# Patient Record
Sex: FEMALE | Race: WHITE | NOT HISPANIC OR LATINO | Employment: OTHER | URBAN - METROPOLITAN AREA
[De-identification: names, ages, dates, MRNs, and addresses within clinical notes are randomized per-mention and may not be internally consistent; named-entity substitution may affect disease eponyms.]

---

## 2017-04-13 ENCOUNTER — RECORDS - HEALTHEAST (OUTPATIENT)
Dept: LAB | Facility: CLINIC | Age: 62
End: 2017-04-13

## 2017-04-13 LAB
CHOLEST SERPL-MCNC: 140 MG/DL
FASTING STATUS PATIENT QL REPORTED: ABNORMAL
HDLC SERPL-MCNC: 35 MG/DL
LDLC SERPL CALC-MCNC: 79 MG/DL
TRIGL SERPL-MCNC: 131 MG/DL

## 2018-01-29 ENCOUNTER — RECORDS - HEALTHEAST (OUTPATIENT)
Dept: LAB | Facility: CLINIC | Age: 63
End: 2018-01-29

## 2018-01-29 LAB — TSH SERPL DL<=0.005 MIU/L-ACNC: 0.17 UIU/ML (ref 0.3–5)

## 2018-05-24 ENCOUNTER — RECORDS - HEALTHEAST (OUTPATIENT)
Dept: LAB | Facility: CLINIC | Age: 63
End: 2018-05-24

## 2018-05-24 LAB
ALBUMIN SERPL-MCNC: 3.9 G/DL (ref 3.5–5)
ALP SERPL-CCNC: 88 U/L (ref 45–120)
ALT SERPL W P-5'-P-CCNC: 14 U/L (ref 0–45)
ANION GAP SERPL CALCULATED.3IONS-SCNC: 11 MMOL/L (ref 5–18)
AST SERPL W P-5'-P-CCNC: 9 U/L (ref 0–40)
BILIRUB SERPL-MCNC: 0.4 MG/DL (ref 0–1)
BUN SERPL-MCNC: 22 MG/DL (ref 8–22)
CALCIUM SERPL-MCNC: 10 MG/DL (ref 8.5–10.5)
CHLORIDE BLD-SCNC: 102 MMOL/L (ref 98–107)
CHOLEST SERPL-MCNC: 169 MG/DL
CO2 SERPL-SCNC: 27 MMOL/L (ref 22–31)
CREAT SERPL-MCNC: 0.76 MG/DL (ref 0.6–1.1)
FASTING STATUS PATIENT QL REPORTED: ABNORMAL
GFR SERPL CREATININE-BSD FRML MDRD: >60 ML/MIN/1.73M2
GLUCOSE BLD-MCNC: 112 MG/DL (ref 70–125)
HDLC SERPL-MCNC: 45 MG/DL
LDLC SERPL CALC-MCNC: 96 MG/DL
POTASSIUM BLD-SCNC: 3.8 MMOL/L (ref 3.5–5)
PROT SERPL-MCNC: 7.1 G/DL (ref 6–8)
SODIUM SERPL-SCNC: 140 MMOL/L (ref 136–145)
TRIGL SERPL-MCNC: 142 MG/DL
TSH SERPL DL<=0.005 MIU/L-ACNC: 0.37 UIU/ML (ref 0.3–5)

## 2019-05-14 ENCOUNTER — RECORDS - HEALTHEAST (OUTPATIENT)
Dept: LAB | Facility: CLINIC | Age: 64
End: 2019-05-14

## 2019-05-14 LAB
ALBUMIN SERPL-MCNC: 3.8 G/DL (ref 3.5–5)
ALP SERPL-CCNC: 73 U/L (ref 45–120)
ALT SERPL W P-5'-P-CCNC: 19 U/L (ref 0–45)
ANION GAP SERPL CALCULATED.3IONS-SCNC: 9 MMOL/L (ref 5–18)
AST SERPL W P-5'-P-CCNC: 17 U/L (ref 0–40)
BILIRUB SERPL-MCNC: 0.2 MG/DL (ref 0–1)
BUN SERPL-MCNC: 16 MG/DL (ref 8–22)
CALCIUM SERPL-MCNC: 9.7 MG/DL (ref 8.5–10.5)
CHLORIDE BLD-SCNC: 102 MMOL/L (ref 98–107)
CHOLEST SERPL-MCNC: 137 MG/DL
CO2 SERPL-SCNC: 27 MMOL/L (ref 22–31)
CREAT SERPL-MCNC: 0.73 MG/DL (ref 0.6–1.1)
FASTING STATUS PATIENT QL REPORTED: ABNORMAL
GFR SERPL CREATININE-BSD FRML MDRD: >60 ML/MIN/1.73M2
GLUCOSE BLD-MCNC: 71 MG/DL (ref 70–125)
HDLC SERPL-MCNC: 37 MG/DL
LDLC SERPL CALC-MCNC: 71 MG/DL
POTASSIUM BLD-SCNC: 4.2 MMOL/L (ref 3.5–5)
PROT SERPL-MCNC: 6.7 G/DL (ref 6–8)
SODIUM SERPL-SCNC: 138 MMOL/L (ref 136–145)
TRIGL SERPL-MCNC: 147 MG/DL
TSH SERPL DL<=0.005 MIU/L-ACNC: 1.8 UIU/ML (ref 0.3–5)

## 2019-05-15 ENCOUNTER — AMBULATORY - HEALTHEAST (OUTPATIENT)
Dept: SURGERY | Facility: CLINIC | Age: 64
End: 2019-05-15

## 2019-05-15 DIAGNOSIS — E66.01 MORBID OBESITY (H): ICD-10-CM

## 2019-05-17 ENCOUNTER — AMBULATORY - HEALTHEAST (OUTPATIENT)
Dept: SURGERY | Facility: CLINIC | Age: 64
End: 2019-05-17

## 2019-05-17 RX ORDER — ALBUTEROL SULFATE 90 UG/1
2 AEROSOL, METERED RESPIRATORY (INHALATION) EVERY 6 HOURS PRN
Status: SHIPPED | COMMUNITY
Start: 2019-05-17

## 2019-05-17 RX ORDER — DIPHENHYDRAMINE HCL 25 MG
25 CAPSULE ORAL EVERY 6 HOURS PRN
Status: SHIPPED | COMMUNITY
Start: 2019-05-17

## 2019-05-17 RX ORDER — CYCLOBENZAPRINE HCL 10 MG
5-10 TABLET ORAL 3 TIMES DAILY PRN
Status: SHIPPED | COMMUNITY
Start: 2019-05-17 | End: 2022-02-27

## 2019-05-17 RX ORDER — BUDESONIDE AND FORMOTEROL FUMARATE DIHYDRATE 160; 4.5 UG/1; UG/1
2 AEROSOL RESPIRATORY (INHALATION) EVERY MORNING
Status: SHIPPED | COMMUNITY
Start: 2019-05-17

## 2019-12-20 ENCOUNTER — RECORDS - HEALTHEAST (OUTPATIENT)
Dept: ADMINISTRATIVE | Facility: OTHER | Age: 64
End: 2019-12-20

## 2019-12-20 ENCOUNTER — RECORDS - HEALTHEAST (OUTPATIENT)
Dept: LAB | Facility: CLINIC | Age: 64
End: 2019-12-20

## 2019-12-22 LAB — 25(OH)D3 SERPL-MCNC: 13 NG/ML (ref 30–80)

## 2019-12-24 ENCOUNTER — AMBULATORY - HEALTHEAST (OUTPATIENT)
Dept: ADMINISTRATIVE | Facility: CLINIC | Age: 64
End: 2019-12-24

## 2019-12-24 DIAGNOSIS — E66.01 MORBID OBESITY (H): ICD-10-CM

## 2020-01-14 ENCOUNTER — RECORDS - HEALTHEAST (OUTPATIENT)
Dept: ADMINISTRATIVE | Facility: OTHER | Age: 65
End: 2020-01-14

## 2020-07-27 ENCOUNTER — RECORDS - HEALTHEAST (OUTPATIENT)
Dept: LAB | Facility: CLINIC | Age: 65
End: 2020-07-27

## 2020-07-27 LAB
ALBUMIN SERPL-MCNC: 3.7 G/DL (ref 3.5–5)
ALP SERPL-CCNC: 82 U/L (ref 45–120)
ALT SERPL W P-5'-P-CCNC: 14 U/L (ref 0–45)
ANION GAP SERPL CALCULATED.3IONS-SCNC: 12 MMOL/L (ref 5–18)
AST SERPL W P-5'-P-CCNC: 10 U/L (ref 0–40)
BILIRUB SERPL-MCNC: 0.3 MG/DL (ref 0–1)
BUN SERPL-MCNC: 13 MG/DL (ref 8–22)
CALCIUM SERPL-MCNC: 9.4 MG/DL (ref 8.5–10.5)
CHLORIDE BLD-SCNC: 99 MMOL/L (ref 98–107)
CHOLEST SERPL-MCNC: 171 MG/DL
CO2 SERPL-SCNC: 27 MMOL/L (ref 22–31)
CREAT SERPL-MCNC: 0.88 MG/DL (ref 0.6–1.1)
FASTING STATUS PATIENT QL REPORTED: ABNORMAL
GFR SERPL CREATININE-BSD FRML MDRD: >60 ML/MIN/1.73M2
GLUCOSE BLD-MCNC: 153 MG/DL (ref 70–125)
HDLC SERPL-MCNC: 34 MG/DL
LDLC SERPL CALC-MCNC: 94 MG/DL
POTASSIUM BLD-SCNC: 3.9 MMOL/L (ref 3.5–5)
PROT SERPL-MCNC: 7.2 G/DL (ref 6–8)
SODIUM SERPL-SCNC: 138 MMOL/L (ref 136–145)
TRIGL SERPL-MCNC: 217 MG/DL
TSH SERPL DL<=0.005 MIU/L-ACNC: 1.44 UIU/ML (ref 0.3–5)

## 2020-07-28 LAB — 25(OH)D3 SERPL-MCNC: 82.6 NG/ML (ref 30–80)

## 2020-11-18 ENCOUNTER — RECORDS - HEALTHEAST (OUTPATIENT)
Dept: LAB | Facility: CLINIC | Age: 65
End: 2020-11-18

## 2020-11-19 LAB — 25(OH)D3 SERPL-MCNC: 60.9 NG/ML (ref 30–80)

## 2020-11-27 ENCOUNTER — COMMUNICATION - HEALTHEAST (OUTPATIENT)
Dept: SCHEDULING | Facility: CLINIC | Age: 65
End: 2020-11-27

## 2020-12-07 ENCOUNTER — RECORDS - HEALTHEAST (OUTPATIENT)
Dept: ADMINISTRATIVE | Facility: OTHER | Age: 65
End: 2020-12-07

## 2020-12-08 ENCOUNTER — RECORDS - HEALTHEAST (OUTPATIENT)
Dept: ADMINISTRATIVE | Facility: OTHER | Age: 65
End: 2020-12-08

## 2020-12-09 ENCOUNTER — AMBULATORY - HEALTHEAST (OUTPATIENT)
Dept: PULMONOLOGY | Facility: OTHER | Age: 65
End: 2020-12-09

## 2020-12-09 ENCOUNTER — COMMUNICATION - HEALTHEAST (OUTPATIENT)
Dept: PULMONOLOGY | Facility: OTHER | Age: 65
End: 2020-12-09

## 2020-12-09 DIAGNOSIS — J42 CHRONIC BRONCHITIS (H): ICD-10-CM

## 2021-02-17 ENCOUNTER — RECORDS - HEALTHEAST (OUTPATIENT)
Dept: LAB | Facility: CLINIC | Age: 66
End: 2021-02-17

## 2021-02-17 LAB — URATE SERPL-MCNC: 8.5 MG/DL (ref 2–7.5)

## 2021-02-18 LAB
BASOPHILS # BLD AUTO: 0.1 THOU/UL (ref 0–0.2)
BASOPHILS NFR BLD AUTO: 1 % (ref 0–2)
EOSINOPHIL # BLD AUTO: 0.5 THOU/UL (ref 0–0.4)
EOSINOPHIL NFR BLD AUTO: 4 % (ref 0–6)
ERYTHROCYTE [DISTWIDTH] IN BLOOD BY AUTOMATED COUNT: 13.2 % (ref 11–14.5)
HCT VFR BLD AUTO: 42.6 % (ref 35–47)
HGB BLD-MCNC: 13.7 G/DL (ref 12–16)
IMM GRANULOCYTES # BLD: 0.2 THOU/UL
IMM GRANULOCYTES NFR BLD: 1 %
LAB AP CHARGES (HE HISTORICAL CONVERSION): NORMAL
LYMPHOCYTES # BLD AUTO: 2.3 THOU/UL (ref 0.8–4.4)
LYMPHOCYTES NFR BLD AUTO: 15 % (ref 20–40)
MCH RBC QN AUTO: 28.8 PG (ref 27–34)
MCHC RBC AUTO-ENTMCNC: 32.2 G/DL (ref 32–36)
MCV RBC AUTO: 90 FL (ref 80–100)
MONOCYTES # BLD AUTO: 1.1 THOU/UL (ref 0–0.9)
MONOCYTES NFR BLD AUTO: 8 % (ref 2–10)
NEUTROPHILS # BLD AUTO: 10.7 THOU/UL (ref 2–7.7)
NEUTROPHILS NFR BLD AUTO: 72 % (ref 50–70)
OVALOCYTES: ABNORMAL
PATH REPORT.COMMENTS IMP SPEC: NORMAL
PATH REPORT.COMMENTS IMP SPEC: NORMAL
PATH REPORT.FINAL DX SPEC: NORMAL
PATH REPORT.MICROSCOPIC SPEC OTHER STN: ABNORMAL
PATH REPORT.MICROSCOPIC SPEC OTHER STN: NORMAL
PATH REPORT.RELEVANT HX SPEC: NORMAL
PLAT MORPH BLD: NORMAL
PLATELET # BLD AUTO: 439 THOU/UL (ref 140–440)
PMV BLD AUTO: 9 FL (ref 8.5–12.5)
POLYCHROMASIA BLD QL SMEAR: ABNORMAL
RBC # BLD AUTO: 4.75 MILL/UL (ref 3.8–5.4)
REACTIVE LYMPHS: ABNORMAL
WBC: 14.9 THOU/UL (ref 4–11)

## 2021-03-30 ENCOUNTER — RECORDS - HEALTHEAST (OUTPATIENT)
Dept: LAB | Facility: CLINIC | Age: 66
End: 2021-03-30

## 2021-03-31 LAB — URATE SERPL-MCNC: 8.5 MG/DL (ref 2–7.5)

## 2021-04-01 LAB — 25(OH)D3 SERPL-MCNC: 41.9 NG/ML (ref 30–80)

## 2021-05-07 ENCOUNTER — RECORDS - HEALTHEAST (OUTPATIENT)
Dept: LAB | Facility: CLINIC | Age: 66
End: 2021-05-07

## 2021-05-07 LAB — HIV 1+2 AB+HIV1 P24 AG SERPL QL IA: NEGATIVE

## 2021-05-10 LAB
HCV AB SERPL QL IA: NEGATIVE
HEPATITIS B SURFACE ANTIBODY LHE- HISTORICAL: NEGATIVE

## 2021-05-12 ENCOUNTER — AMBULATORY - HEALTHEAST (OUTPATIENT)
Dept: NEUROSURGERY | Facility: CLINIC | Age: 66
End: 2021-05-12

## 2021-05-12 DIAGNOSIS — M54.9 BACK PAIN: ICD-10-CM

## 2021-05-17 ENCOUNTER — RECORDS - HEALTHEAST (OUTPATIENT)
Dept: RADIOLOGY | Facility: CLINIC | Age: 66
End: 2021-05-17

## 2021-05-18 ENCOUNTER — OFFICE VISIT - HEALTHEAST (OUTPATIENT)
Dept: NEUROSURGERY | Facility: CLINIC | Age: 66
End: 2021-05-18

## 2021-05-18 ENCOUNTER — RECORDS - HEALTHEAST (OUTPATIENT)
Dept: GENERAL RADIOLOGY | Facility: CLINIC | Age: 66
End: 2021-05-18

## 2021-05-18 DIAGNOSIS — M54.16 LUMBAR RADICULOPATHY: ICD-10-CM

## 2021-05-18 DIAGNOSIS — M54.9 DORSALGIA, UNSPECIFIED: ICD-10-CM

## 2021-05-19 ENCOUNTER — RECORDS - HEALTHEAST (OUTPATIENT)
Dept: ADMINISTRATIVE | Facility: OTHER | Age: 66
End: 2021-05-19

## 2021-05-19 ASSESSMENT — MIFFLIN-ST. JEOR: SCORE: 1988.78

## 2021-05-21 ENCOUNTER — SURGERY - HEALTHEAST (OUTPATIENT)
Dept: NEUROSURGERY | Facility: CLINIC | Age: 66
End: 2021-05-21

## 2021-05-21 DIAGNOSIS — M54.16 LUMBAR RADICULOPATHY: ICD-10-CM

## 2021-05-24 ENCOUNTER — RECORDS - HEALTHEAST (OUTPATIENT)
Dept: ADMINISTRATIVE | Facility: CLINIC | Age: 66
End: 2021-05-24

## 2021-05-25 ENCOUNTER — RECORDS - HEALTHEAST (OUTPATIENT)
Dept: ADMINISTRATIVE | Facility: CLINIC | Age: 66
End: 2021-05-25

## 2021-05-26 ENCOUNTER — RECORDS - HEALTHEAST (OUTPATIENT)
Dept: ADMINISTRATIVE | Facility: CLINIC | Age: 66
End: 2021-05-26

## 2021-05-27 ENCOUNTER — RECORDS - HEALTHEAST (OUTPATIENT)
Dept: ADMINISTRATIVE | Facility: CLINIC | Age: 66
End: 2021-05-27

## 2021-05-27 ENCOUNTER — COMMUNICATION - HEALTHEAST (OUTPATIENT)
Dept: NEUROSURGERY | Facility: CLINIC | Age: 66
End: 2021-05-27

## 2021-05-27 VITALS — WEIGHT: 293 LBS | BODY MASS INDEX: 48.55 KG/M2 | HEIGHT: 67 IN

## 2021-05-27 VITALS
HEART RATE: 80 BPM | DIASTOLIC BLOOD PRESSURE: 76 MMHG | SYSTOLIC BLOOD PRESSURE: 138 MMHG | OXYGEN SATURATION: 94 % | RESPIRATION RATE: 16 BRPM

## 2021-05-28 ENCOUNTER — RECORDS - HEALTHEAST (OUTPATIENT)
Dept: ADMINISTRATIVE | Facility: CLINIC | Age: 66
End: 2021-05-28

## 2021-05-29 ENCOUNTER — AMBULATORY - HEALTHEAST (OUTPATIENT)
Dept: SURGERY | Facility: HOSPITAL | Age: 66
End: 2021-05-29

## 2021-05-29 DIAGNOSIS — Z11.59 ENCOUNTER FOR SCREENING FOR OTHER VIRAL DISEASES: ICD-10-CM

## 2021-05-30 ENCOUNTER — RECORDS - HEALTHEAST (OUTPATIENT)
Dept: ADMINISTRATIVE | Facility: CLINIC | Age: 66
End: 2021-05-30

## 2021-05-31 ENCOUNTER — RECORDS - HEALTHEAST (OUTPATIENT)
Dept: ADMINISTRATIVE | Facility: CLINIC | Age: 66
End: 2021-05-31

## 2021-06-01 ENCOUNTER — RECORDS - HEALTHEAST (OUTPATIENT)
Dept: ADMINISTRATIVE | Facility: CLINIC | Age: 66
End: 2021-06-01

## 2021-06-02 ENCOUNTER — RECORDS - HEALTHEAST (OUTPATIENT)
Dept: ADMINISTRATIVE | Facility: CLINIC | Age: 66
End: 2021-06-02

## 2021-06-02 ENCOUNTER — COMMUNICATION - HEALTHEAST (OUTPATIENT)
Dept: NEUROSURGERY | Facility: CLINIC | Age: 66
End: 2021-06-02

## 2021-06-02 ENCOUNTER — AMBULATORY - HEALTHEAST (OUTPATIENT)
Dept: NEUROSURGERY | Facility: CLINIC | Age: 66
End: 2021-06-02

## 2021-06-02 DIAGNOSIS — Z01.818 PRE-OP EXAM: ICD-10-CM

## 2021-06-14 ENCOUNTER — RECORDS - HEALTHEAST (OUTPATIENT)
Dept: ADMINISTRATIVE | Facility: OTHER | Age: 66
End: 2021-06-14

## 2021-06-16 PROBLEM — M1A.00X0 CHRONIC GOUTY ARTHRITIS: Status: ACTIVE | Noted: 2021-05-18

## 2021-06-16 PROBLEM — M1A.9XX0 CHRONIC GOUTY ARTHRITIS: Status: ACTIVE | Noted: 2021-05-18

## 2021-06-16 PROBLEM — M54.16 LUMBAR RADICULOPATHY: Status: ACTIVE | Noted: 2021-05-26

## 2021-06-16 PROBLEM — J45.990 EXERCISE-INDUCED ASTHMA: Status: ACTIVE | Noted: 2021-05-11

## 2021-06-16 PROBLEM — F41.9 ANXIETY: Status: ACTIVE | Noted: 2021-05-11

## 2021-06-16 PROBLEM — E55.9 VITAMIN D DEFICIENCY: Status: ACTIVE | Noted: 2021-05-11

## 2021-06-16 PROBLEM — K21.9 GASTRO-ESOPHAGEAL REFLUX DISEASE WITHOUT ESOPHAGITIS: Status: ACTIVE | Noted: 2021-05-11

## 2021-06-16 PROBLEM — F33.1 MODERATE RECURRENT MAJOR DEPRESSION (H): Status: ACTIVE | Noted: 2021-05-11

## 2021-06-16 PROBLEM — K56.609 SBO (SMALL BOWEL OBSTRUCTION) (H): Status: ACTIVE | Noted: 2020-11-25

## 2021-06-16 PROBLEM — M54.30 SCIATICA: Status: ACTIVE | Noted: 2021-05-18

## 2021-06-16 PROBLEM — R07.9 CHEST PAIN OF UNCERTAIN ETIOLOGY: Status: ACTIVE | Noted: 2017-07-09

## 2021-06-16 PROBLEM — F17.200 CURRENT SMOKER: Status: ACTIVE | Noted: 2021-05-11

## 2021-06-16 PROBLEM — M10.00 IDIOPATHIC GOUT: Status: ACTIVE | Noted: 2021-05-11

## 2021-06-17 ENCOUNTER — RECORDS - HEALTHEAST (OUTPATIENT)
Dept: LAB | Facility: CLINIC | Age: 66
End: 2021-06-17

## 2021-06-17 LAB
ANION GAP SERPL CALCULATED.3IONS-SCNC: 15 MMOL/L (ref 5–18)
APTT PPP: 29 SECONDS (ref 24–37)
BUN SERPL-MCNC: 12 MG/DL (ref 8–22)
CALCIUM SERPL-MCNC: 9 MG/DL (ref 8.5–10.5)
CHLORIDE BLD-SCNC: 101 MMOL/L (ref 98–107)
CLOSURE TME COLL+EPINEP BLD: 79 SEC (ref 1–180)
CO2 SERPL-SCNC: 23 MMOL/L (ref 22–31)
CREAT SERPL-MCNC: 0.76 MG/DL (ref 0.6–1.1)
GFR SERPL CREATININE-BSD FRML MDRD: >60 ML/MIN/1.73M2
GLUCOSE BLD-MCNC: 150 MG/DL (ref 70–125)
INR PPP: 0.98 (ref 0.9–1.1)
POTASSIUM BLD-SCNC: 4.1 MMOL/L (ref 3.5–5)
SODIUM SERPL-SCNC: 139 MMOL/L (ref 136–145)
TSH SERPL DL<=0.005 MIU/L-ACNC: 7.86 UIU/ML (ref 0.3–5)

## 2021-06-17 NOTE — PROGRESS NOTES
Neurosurgery consultation was requested by: Disha   Pain: Low back   Radicular Pain is present: Right leg   Lhermitte sign: No  Motor complaints: Weakness right leg   Sensory complaints: Numbness tingling right foot   Gait and balance issues: Trouble walking due to pain - uses walker   Bowel or bladder issues: No  Injury: No  Severity is: Chronic   Patient has tried the following conservative measures: Injections, Water/Pool     Leigha Bond CMA,1:56 PM

## 2021-06-17 NOTE — PROGRESS NOTES
NEUROSURGERY CONSULTATION NOTE    5/18/2021     Sharmila Shore is a 65 y.o. female who is sent to us in consultation by Lex Gauthier for evaluation of lumbar radiculopathy.         CONSULTATION ASSESSMENT AND PLAN:    64 yo female who presents with 6 months of low back and right leg pain.  Right L5 radiculopathy with weakness.  Lumbar x-rays did not show any significant dynamic instability.  MRI lumbar spine shows a right posterior lateral disc herniation at lumbar 4-5 in combination with facet arthropathy impinge the right L5 nerve root.  Patient has not had significant improvement with conservative management.  We discussed that patient's morbid obesity would increase risk of complication with surgery including but not limited to wound infection,  post op hematoma, recurrent disc herniation. Discussed risks and benefits of right L4-5 microdiscectomy in detail. She agreed to proceed.     I spent more than 45 minutes in this apt, examining the pt, reviewing the scans, reviewing notes from chart, discussing treatment options with risks and benefits and coordinating care.     Celina Hyde     HPI:  64 yo female who presents with 6 months of low back and right leg pain. Right leg pain and numbness and tingling in right L5 distribution. Right leg feels weak. Laying on the side will improve her symptoms. Sitting, standing and walking worsen her pain.  Has gained weight due to inability to ambulate. Urinary urgency for last year. Wearing pads. No bowel dysfunction.     Medrol dose pack did not cause significant relief.  TFESI 1 month of relief. 2nd no relief. Norco and flexeril without significant relief.     Prior Spine Surgery:no     Past Medical History:   Diagnosis Date     CAD (coronary artery disease)      Chronic pain syndrome      COPD (chronic obstructive pulmonary disease) (H)      COPD (chronic obstructive pulmonary disease) (H)      Disease of thyroid gland      Hyperlipidemia      Hypertension       Hypothyroidism      OAB (overactive bladder)      Obesity        Past Surgical History:   Procedure Laterality Date     APPENDECTOMY       CARDIAC CATHETERIZATION       COLON SURGERY       HERNIA REPAIR       HYSTERECTOMY       TUBAL LIGATION         REVIEW OF SYSTEMS:  ROS reviewed with pt as documented on pt health form of 5/18/2021.     No family hx of anesthetic reactions. Mother and grandmother have a h/o of blood clots.    MEDICATIONS:  Current Outpatient Medications   Medication Sig Dispense Refill     albuterol (PROAIR HFA;PROVENTIL HFA;VENTOLIN HFA) 90 mcg/actuation inhaler Inhale 2 puffs every 6 (six) hours as needed for wheezing.        budesonide-formoterol (SYMBICORT) 160-4.5 mcg/actuation inhaler Inhale 2 puffs every morning.        cyclobenzaprine (FLEXERIL) 10 MG tablet Take 5-10 mg by mouth 3 (three) times a day as needed for muscle spasms.        diphenhydrAMINE (BENADRYL) 25 mg capsule Take 25 mg by mouth at bedtime as needed for itching.       HYDROcodone-acetaminophen (NORCO) 7.5-325 mg per tablet Take 1 tablet by mouth 3 (three) times a day.        levothyroxine (SYNTHROID, LEVOTHROID) 112 MCG tablet Take 112 mcg by mouth Daily at 6:00 am.       losartan-hydrochlorothiazide (HYZAAR) 50-12.5 mg per tablet Take 1 tablet by mouth Daily after lunch.       nitroglycerin (NITROSTAT) 0.4 MG SL tablet Place 1 tablet (0.4 mg total) under the tongue daily as needed for chest pain. 30 tablet 0     ondansetron (ZOFRAN) 4 MG tablet Take 1 tablet (4 mg total) by mouth every 8 (eight) hours as needed. 15 tablet 0     prochlorperazine (COMPAZINE) 5 MG tablet Take 1 tablet (5 mg total) by mouth every 6 (six) hours as needed. 15 tablet 0     simvastatin (ZOCOR) 20 MG tablet Take 20 mg by mouth at bedtime.       No current facility-administered medications for this visit.          ALLERGIES/SENSITIVITIES:     Allergies   Allergen Reactions     Amoxicillin-Pot Clavulanate Other (See Comments)     Stomach upset.  "    Azithromycin Hives     Macrobid [Nitrofurantoin Monohyd/M-Cryst] Hives     Morphine Headache     Oxycodone Itching     In Percocet. Okay with acetaminophen.      Sulfa (Sulfonamide Antibiotics) Hives       PERTINENT SOCIAL HISTORY:   Social History     Socioeconomic History     Marital status: Single     Spouse name: Not on file     Number of children: Not on file     Years of education: Not on file     Highest education level: Not on file   Occupational History     Not on file   Social Needs     Financial resource strain: Not on file     Food insecurity     Worry: Not on file     Inability: Not on file     Transportation needs     Medical: Not on file     Non-medical: Not on file   Tobacco Use     Smoking status: Former Smoker     Quit date: 2018     Years since quittin.4   Substance and Sexual Activity     Alcohol use: No     Drug use: No     Sexual activity: Not on file   Lifestyle     Physical activity     Days per week: Not on file     Minutes per session: Not on file     Stress: Not on file   Relationships     Social connections     Talks on phone: Not on file     Gets together: Not on file     Attends Tenriism service: Not on file     Active member of club or organization: Not on file     Attends meetings of clubs or organizations: Not on file     Relationship status: Not on file     Intimate partner violence     Fear of current or ex partner: Not on file     Emotionally abused: Not on file     Physically abused: Not on file     Forced sexual activity: Not on file   Other Topics Concern     Not on file   Social History Narrative     Not on file         FAMILY HISTORY:  Family History   Problem Relation Age of Onset     Heart attack Father         PHYSICAL EXAM:   Constitutional: /76   Pulse 80   Resp 16   Ht 5' 7\" (1.702 m)   Wt (!) 310 lb (140.6 kg)   SpO2 94%   BMI 48.55 kg/m       Mental Status: A & O in no acute distress.  Affect is appropriate.  Speech is fluent.  Recent and " remote memory are intact.  Attention span and concentration are normal.     Motor:  Normal bulk and tone all muscle groups of upper and lower extremities. R DF and EHL 4/5     Sensory: Sensation intact bilaterally to light touch diminished in right L5     Coordination:  Very antalgic gait      Reflexes; supinator, biceps, triceps, knee/ ankle jerk intact. no hoffmans/ no    babinski/ clonus.    IMAGING: I personally reviewed all radiographic images      Cc:   Lex Gauthier MD  [address] [facility] [address]

## 2021-06-17 NOTE — PATIENT INSTRUCTIONS - HE
Right L4-5 Microdiscectomy.     Provided complete information about approaching surgery.  Discussed discharge planning and expected outcomes after surgery as well as follow-ups and restrictions.  Emphasized on stop taking ASA, NSAID's, vitamins and /or OTC herbal supplements within 10 days and any anticoagulant meds within 7 days prior to surgery.  Reminded patient to bring all pertinent films to hospital the day of surgery.    NPO after midnight, Please arrive 2.5 hours prior to scheduled surgery.    Using a washcloth and a bottle of provided Hibiclens, wash your body, avoiding your face and genitals. Preferably, shower the night before surgery and the morning of surgery using a half a bottle each time for your whole body shower. If you are unable to take a shower in the morning of surgery, please discuss your options with the nurse at your readiness visit.     Provided written pamphlets about surgery and Hibiclens bottle.  Answered all questions.  The  will call patient with all pre-op orders and instructions.  Patient to complete all required diagnostic tests prior to surgery.  If test are not completed this will cancel the surgery; contact the clinic nurses at [phone] if unable to complete test.

## 2021-06-18 ENCOUNTER — COMMUNICATION - HEALTHEAST (OUTPATIENT)
Dept: NEUROSURGERY | Facility: CLINIC | Age: 66
End: 2021-06-18

## 2021-06-18 ENCOUNTER — RECORDS - HEALTHEAST (OUTPATIENT)
Dept: ADMINISTRATIVE | Facility: OTHER | Age: 66
End: 2021-06-18

## 2021-06-18 LAB
SARS-COV-2 PCR COMMENT: NORMAL
SARS-COV-2 RNA SPEC QL NAA+PROBE: NEGATIVE
SARS-COV-2 VIRUS SPECIMEN SOURCE: NORMAL

## 2021-06-18 ASSESSMENT — MIFFLIN-ST. JEOR: SCORE: 1988.78

## 2021-06-21 ENCOUNTER — RECORDS - HEALTHEAST (OUTPATIENT)
Dept: ADMINISTRATIVE | Facility: OTHER | Age: 66
End: 2021-06-21

## 2021-06-21 ENCOUNTER — SURGERY - HEALTHEAST (OUTPATIENT)
Dept: SURGERY | Facility: HOSPITAL | Age: 66
End: 2021-06-21
Payer: COMMERCIAL

## 2021-06-21 ASSESSMENT — MIFFLIN-ST. JEOR: SCORE: 1949.32

## 2021-06-21 NOTE — LETTER
Letter by Amari Estrada MD at      Author: Amari Estrada MD Service: -- Author Type: --    Filed:  Encounter Date: 12/9/2020 Status: (Other)         Sharmila Shore  [address] [place] [address]    December 9, 2020    Dear Ms. Shore,    Welcome to [facility]! Your appointment information is below.   Please bring the following to your appointment:    Insurance Card, so we may scan it for our records    Drivers license or valid ID, so we may scan it for our records    Co-pay (as applicable per your insurance plan)    A current list of your medications including over the counter products such as vitamins and supplements    Your medical records including copies of X-Ray films if you are transferring your care from another clinic.  If you do not have your records, please fill out the release of information form and we will request those records.     Provider: Amari Estrada MD  Appointment Date:   Wednesday, February 3, 2021  Arrival Time:  10:00 PFT,  11:00 dr appt.    Location: [address]        [facility], [address]    **Please allow adequate time for your commute and parking. If you are more than 10 minutes late, you may be asked to reschedule.     If you need to cancel or reschedule your appointment, please notify us at least 24 hours prior to your appointment time so we are able to make this time available for another patient.    Thank you for choosing the [facility] for your health care needs. If you have any questions, please do not hesitate to contact us at any time at   [phone]. We look forward to caring for you.     Sincerely,     [facility] [place] staff

## 2021-06-22 ENCOUNTER — RECORDS - HEALTHEAST (OUTPATIENT)
Dept: ADMINISTRATIVE | Facility: OTHER | Age: 66
End: 2021-06-22

## 2021-06-22 ASSESSMENT — MIFFLIN-ST. JEOR: SCORE: 1988.78

## 2021-06-25 NOTE — TELEPHONE ENCOUNTER
ORDER FROM: Dr. Hyde    PRE AUTHORIZATION: PA pending. Ok to schedule    METHOD OF PATIENT CONTACT: Spoke with Sharmila on the phone. Best number to reach: [phone]    PROCEDURE: Right lumbar 4-lumbar 5 hemilaminectomy, medial facetectomy, foraminotomy, microdiscectomy    SURGICAL DATE: 21 @ 7:15 AM - [facility]    READINESS VISIT: PLEASE CALL    PCP, CLINIC, PHONE #: Dr. Lex Gauthier, [facility], [phone]    PRE-OP PHYSICAL: 6/15/21 @ 1:15 PM with Dr. Gauthier    COVID-19 TESTIN21 @ [facility]    FILM INFO: XRAY LUMBAR: 2021 @ [facility]          MRI LUMBAR: 3/30/21 @ [facility] (loaded to [facility])    SURGERY CONFIRMATION LETTER: E-mailed to patient on 21

## 2021-06-26 NOTE — TELEPHONE ENCOUNTER
Called patient, discussed surgery, post-op course, expectations, follow up plan.    Reviewed H&P from 06/15/2021 - cleared for surgery  Labs, EKG - WNL     MRI done on 03/30/2021 - in [facility]    To OR as planned.     Check in - 0500    Nothing to eat or drink after midnight the night before surgery.     Reviewed with patient: Arrive 2.5 -3 hours prior to scheduled surgery.    Bring all pertinent films to hospital the day of surgery.     Continue to refrain from NSAIDS (Ibuprofen, Aleve, Naprosyn), ASA, Over the counter herbal medications or supplements, anti-coagulants and blood thinners.     Patient confirmed they have help/assistance in place at home upon discharge    Instructions: using a washcloth and a bottle of provided Hibiclens, wash your body, avoiding your face and genitals. Preferably, shower the night before surgery and the morning of surgery using a half a bottle each time for your whole body shower.        Patient was informed that we will provide up to 1 week prescription of pain medication for post-operative pain.      Instructed patient about the following: After your surgery, if you will be staying in-patient, a nursing provider team will be monitoring you closely throughout your stay and communicate your health status to your surgeon and other providers.  You will be seen by Advanced Practice Providers (e.g., nurse practitioners, clinic nurse specialist, and physician assistants) who will check on you regularly to assess the status of your surgery.     Gabby Walters RN, CNRN

## 2021-06-29 ENCOUNTER — COMMUNICATION - HEALTHEAST (OUTPATIENT)
Dept: SCHEDULING | Facility: CLINIC | Age: 66
End: 2021-06-29

## 2021-06-30 ENCOUNTER — RECORDS - HEALTHEAST (OUTPATIENT)
Dept: LAB | Facility: CLINIC | Age: 66
End: 2021-06-30

## 2021-06-30 ENCOUNTER — COMMUNICATION - HEALTHEAST (OUTPATIENT)
Dept: NEUROSURGERY | Facility: CLINIC | Age: 66
End: 2021-06-30

## 2021-07-01 ENCOUNTER — COMMUNICATION - HEALTHEAST (OUTPATIENT)
Dept: GERIATRICS | Facility: CLINIC | Age: 66
End: 2021-07-01

## 2021-07-01 LAB
ANION GAP SERPL CALCULATED.3IONS-SCNC: 11 MMOL/L (ref 5–18)
BUN SERPL-MCNC: 10 MG/DL (ref 8–22)
CALCIUM SERPL-MCNC: 8.9 MG/DL (ref 8.5–10.5)
CHLORIDE BLD-SCNC: 98 MMOL/L (ref 98–107)
CO2 SERPL-SCNC: 30 MMOL/L (ref 22–31)
CREAT SERPL-MCNC: 0.75 MG/DL (ref 0.6–1.1)
ERYTHROCYTE [DISTWIDTH] IN BLOOD BY AUTOMATED COUNT: 13.9 % (ref 11–14.5)
GFR SERPL CREATININE-BSD FRML MDRD: >60 ML/MIN/1.73M2
GLUCOSE BLD-MCNC: 105 MG/DL (ref 70–125)
HCT VFR BLD AUTO: 40.2 % (ref 35–47)
HGB BLD-MCNC: 12.5 G/DL (ref 12–16)
MCH RBC QN AUTO: 28.9 PG (ref 27–34)
MCHC RBC AUTO-ENTMCNC: 31.1 G/DL (ref 32–36)
MCV RBC AUTO: 93 FL (ref 80–100)
PLATELET # BLD AUTO: 399 THOU/UL (ref 140–440)
PMV BLD AUTO: 9.5 FL (ref 8.5–12.5)
POTASSIUM BLD-SCNC: 4.2 MMOL/L (ref 3.5–5)
RBC # BLD AUTO: 4.33 MILL/UL (ref 3.8–5.4)
SODIUM SERPL-SCNC: 139 MMOL/L (ref 136–145)
WBC: 11 THOU/UL (ref 4–11)

## 2021-07-04 NOTE — TELEPHONE ENCOUNTER
Telephone Encounter by Alfredo Osullivan RN at 7/1/2021  4:08 PM     Author: Alfredo Osullivan RN Service: -- Author Type: Registered Nurse    Filed: 7/1/2021  4:10 PM Encounter Date: 7/1/2021 Status: Signed    : Alfredo Osullivan RN (Registered Nurse)       [facility] Nurse Triage Telephone Note      Provider: Jozef Quispe DO  Facility: [facility]    Facility Type: TCU    Caller: Rocío  Call Back Number:  [phone]    Allergies: Amoxicillin-pot clavulanate, Azithromycin, Macrobid [nitrofurantoin monohyd/m-cryst], Morphine, Oxycodone, Sulfa (sulfonamide antibiotics), and Gabapentin    Reason for call: Nurse calling to report Heme 2 and BMP results.  Notable meds:  Allopurinol 300mg daily, Hyzaar 50-12.5mg daily, Metformin 1000mg two times a day.       Verbal Order/Direction given by Provider: No new orders.      Provider giving order: Jozef Quispe DO    Verbal order given to: Rocío Osullivan RN

## 2021-07-04 NOTE — TELEPHONE ENCOUNTER
Telephone Encounter by Dahlia Abbasi at 6/30/2021  3:39 PM     Author: Dahlia Abbasi Service: -- Author Type: Patient Access    Filed: 6/30/2021  3:40 PM Encounter Date: 6/29/2021 Status: Signed    : Dahlia Abbasi (Patient Access)       6/30/21 Spoke alexandro Morocho at [facility]. She will speak to patient and see if patient prefers staple removal at TCU or if she'd rather come to clinic. Bailee to cb tomorrow to schedule post op appts for patient.

## 2021-07-04 NOTE — LETTER
Letter by Celina Hyde MD at      Author: Celina Hyde MD Service: -- Author Type: --    Filed:  Encounter Date: 5/27/2021 Status: (Other)       Dear Ms. Shore,    This letter will help in preparation for your upcoming surgery. Please contact us with any additional questions you may have regarding your surgery. Contact information for your surgery scheduler:   Chilo Hyde and Ada: [phone] ~ Rosalina Lao and Lexis:  [phone] ~ Brandan    You are scheduled for: Right Lumbar Hemilaminectomy, Medial Facetectomy, Foraminotomy and Microdiscetomy  With: Dr. Celina Hyde  Date/Time: Monday, June 21, 2021 at 7:15 am  (time subject to change)  Location: [place] [address]    Check in at the Welcome Desk inside the main doors of the hospital. An escort will take you to the surgery waiting area. A nurse from [facility] (surgery admit unit) at the hospital will call you with your exact arrival time to the hospital - typically one-and-a-half to two-and-a-half hours prior to your scheduled surgery time.     In the event of an emergency surgery case, there may be an adjustment to your start time for surgery.     PREPARING FOR YOUR SURGERY    *Pre-op Physical: Tuesday, Viviana 15, 2021 at 1:15 pm with Dr. Lex Gauthier at the [facility]. Please have your LABS completed at this appointment as well. Orders have been placed with your primary care provider.     *Please discuss the necessity of receiving a pneumococcal vaccine prior to surgery at your pre-op physical. Recommended for all patients over the age of 65, or based on certain medical conditions.     *After the pre-op physical is complete, please have your clinic fax the visit note to your surgery scheduler at [phone].    *Pre-op Lab Work: Tuesday, Viviana 15, 2021 at your Pre-op Physical appointment.     *COVID-19 Testing: Thursday, June 17th at the [facility], the staff will schedule the time for the test when you are there for your Pre-op physical. The testing needs to done 4 days before surgery.     *Readiness Visit: Dr. Hyde's nurse will call you prior to your procedure to go over your Pre-op physical and lab results, give Pre-surgery instructions and also answer any additional questions you may have about your upcoming surgery.     *Ensure that you have completed your pre-op physical, along with any other necessary tests/appointments (listed above), prior to your Readiness Visit.           ADDITIONAL INFORMATION REGARDING YOUR SURGERY    Medications    Bring a list ALL of your medications, including any over-the-counter vitamins and herbal supplements to your Readiness Visit, and on the day of surgery.    DO NOT bring your medications with you the day of surgery.    Please see attached third sheet for more details on medications/vitamins/herbal supplements that should be discontinued prior to your surgery date.     If you are unsure if you should discontinue a medication/ vitamin/herbal supplement, please call our office and discuss with a nurse.    Continue taking your medications/vitamins/herbal supplements unless they are on the attached list.     Failure to follow the instructions regarding medications/vitamins/herbal supplements will result in cancellation of your surgery.    Day BEFORE Surgery    DO NOT shave near your surgical site. This can cause irritation of the skin    Using a washcloth and provided bottle of Hibiclens, shower the night BEFORE surgery, using a half bottle of Hibiclens to wash your body, avoiding face and genitals. The morning OF surgery, shower and use the second half of the bottle to wash your body, avoiding face and genitals. If you are unable to take a shower the morning of surgery, please discuss your options with the nurse at your readiness visit.     NOTHING  to eat after 11:00 p.m. the night prior to your  procedure    CLEAR LIQUIDS: May have the following liquids up to two (2) hours before your arrival time at the hospital: water, plain black coffee (no cream or milk), plain black tea or plain green tea (no cream or milk), Gatorade or Propel Water.    SMOKING: Stop smoking as far before surgery as possible, or as directed by your surgeon. NO tobacco products of any kind (cigarettes, e-cigarettes, chewing tobacco) beginning at 11:00 p.m. the night prior to your procedure.     ALCOHOL: You should stop drinking alcohol beginning at 11:00 p.m. the night prior to your procedure    Contact our office if you have symptoms of illness such as a fever of 101 or greater, chills, cough, sore throat, or if you develop a rash or any open sore    Day OF Surgery    If youve been instructed to take a medication(s) on the morning of surgery, please take with a very small sip of water.    Wear loose & comfortable clothing and flat shoes, Leave jewelry/valuables at home. If you wear contact lenses, remove them at home and wear glasses. Remove any body piercings. Remove nail polish.     Planning for Discharge    Start planning for your care after discharge as soon as you receive this letter.    If you have not made arrangements to have someone take you home and stay with you for the first 48 hours after discharge, your surgery will be cancelled.        PRE-OPERATIVE MEDICATION INSTRUCTIONS  Review this information with your primary care physician prior to discontinuing any of the medications listed below.  Notify your primary care physician that you have been instructed to discontinue these medications.    TEN (10) Days Prior to Surgery, STOP the Following Medications   Patricia-Maurice  Anacin  Aspirin  Excedrin  Pepto Bismol    **Before taking ANY over-the-counter medications, check the label for Aspirin   Non-steroidal   Anti-inflammatory Medications (NSAIDS)    Celebrex  Diclofenac (Cataflam)  Etodolac (Iodine)  Fenoprofen  (Nalfon)  Ibuprofen (Advil, Motrin, Nuprin)  Indomethacin (Indocin)  Ketoprofen  Ketorolac (Toradol)  Melaxicam (Mobic)  Naproxen (AnaProx, Aleve, Naprosyn)  Relafen (Nabumetone)   Herbal Supplements (this is a partial list of herbals to be discontinued)    Gurpreet Toth Qucorine  Ephedra  Feverfew  Fish Oil  Flaxseed Oil  Garlic  Antonina  Gingko  Ginseng  Goldenseal  Imitrex (Sumatriptan)  Kava  Krill Oil  Licorice  Multi Vitamins  [facility]  Valerian  Vitamin E  Yohimbe   CHECK WITH YOUR PRESCRIBING DOCTOR BEFORE STOPPING ANY BLOOD THINNERS (approximately 7 days prior to surgery)  (Coumadin, Plavix, Platel, Aggrenox, Effient (Prasugrel), Ticlid), Xarelto, and Pradaxa      ALWAYS CHECK WITH YOUR PRESCRIBING DOCTOR REGARDING THE MEDICATIONS LISTED BELOW; RECOMMENDED STOP TIME IS ALSO LISTED      If you are taking Lovenox, discontinue 24 HOURS prior to surgery    If you are taking weight loss medication, discontinue 7 days prior to surgery    If you are taking Metformin or Simvastatin, check with your primary care physician (or whoever has prescribed you this medication) regarding when to discontinue prior to surgery

## 2021-07-05 ENCOUNTER — RECORDS - HEALTHEAST (OUTPATIENT)
Dept: LAB | Facility: CLINIC | Age: 66
End: 2021-07-05

## 2021-07-05 PROBLEM — G93.41 METABOLIC ENCEPHALOPATHY: Status: ACTIVE | Noted: 2021-06-27

## 2021-07-05 PROBLEM — J45.909 ASTHMA: Status: ACTIVE | Noted: 2021-05-11

## 2021-07-05 PROBLEM — N39.0 URINARY TRACT INFECTION: Status: ACTIVE | Noted: 2021-06-27

## 2021-07-05 PROBLEM — E11.9 TYPE 2 DIABETES MELLITUS WITHOUT COMPLICATION, WITHOUT LONG-TERM CURRENT USE OF INSULIN (H): Status: ACTIVE | Noted: 2021-06-25

## 2021-07-05 PROBLEM — M51.16 LUMBAR DISC HERNIATION WITH RADICULOPATHY: Status: ACTIVE | Noted: 2021-06-21

## 2021-07-05 PROBLEM — R73.9 HYPERGLYCEMIA: Status: ACTIVE | Noted: 2021-06-22

## 2021-07-05 PROBLEM — J96.01 ACUTE RESPIRATORY FAILURE WITH HYPOXIA (H): Status: ACTIVE | Noted: 2021-06-27

## 2021-07-05 LAB
ALBUMIN UR-MCNC: NEGATIVE G/DL
APPEARANCE UR: ABNORMAL
BACTERIA #/AREA URNS HPF: ABNORMAL /[HPF]
BILIRUB UR QL STRIP: NEGATIVE
COLOR UR AUTO: ABNORMAL
GLUCOSE UR STRIP-MCNC: NEGATIVE MG/DL
HGB UR QL STRIP: NEGATIVE
HYALINE CASTS: 4 LPF
KETONES UR STRIP-MCNC: NEGATIVE MG/DL
LEUKOCYTE ESTERASE UR QL STRIP: NEGATIVE
MUCOUS THREADS #/AREA URNS LPF: PRESENT LPF
NITRATE UR QL: NEGATIVE
PH UR STRIP: 5 [PH] (ref 5–8)
RBC URINE: 2 HPF
SP GR UR STRIP: 1.01 (ref 1–1.03)
SQUAMOUS EPITHELIAL: 10 /HPF
TRANSITIONAL EPI: <1 /HPF
UROBILINOGEN UR STRIP-ACNC: ABNORMAL
WBC URINE: 2 HPF

## 2021-07-06 VITALS
BODY MASS INDEX: 47.19 KG/M2 | WEIGHT: 293 LBS | BODY MASS INDEX: 48.55 KG/M2 | HEIGHT: 67 IN | BODY MASS INDEX: 47.19 KG/M2 | BODY MASS INDEX: 47.19 KG/M2 | BODY MASS INDEX: 48.55 KG/M2 | HEIGHT: 67 IN | WEIGHT: 293 LBS | HEIGHT: 67 IN

## 2021-07-06 VITALS — BODY MASS INDEX: 48.55 KG/M2 | WEIGHT: 293 LBS

## 2021-07-06 LAB — BACTERIA SPEC CULT: NO GROWTH

## 2021-07-07 NOTE — TELEPHONE ENCOUNTER
PATIENT NAME: Sharmila Shore   YOB: 1955   MRN: [id number]  SURGEON: Barrett  DATE of SURGERY: 6/21/21  PROCEDURE: RIGHT LUMBAR 4-LUMBAR 5 HEMILAMINECTOMY, MEDIAL FACETECTOMY, FORAMINOTOMY, MICRODISCECTOMY      FOLLOW-UP PLAN:   Wound Check: n/a    Staples/Sutures Out : staples out 14 days post-op  Post Op Visit: 6 weeks    Post-op Provider: NP on Dr. Hyde clinic day   DIAGNOSTICS: n/a     DIS[facility]      ADDITIONAL INSTRUCTIONS FOR MEDICAL STAFF: If TCU is able to remove staples, we can do a telephone verbal order. Please let nursing know. Thanks!

## 2021-07-13 VITALS
HEART RATE: 66 BPM | TEMPERATURE: 97.9 F | BODY MASS INDEX: 45.99 KG/M2 | WEIGHT: 293 LBS | OXYGEN SATURATION: 95 % | HEIGHT: 67 IN | DIASTOLIC BLOOD PRESSURE: 59 MMHG | SYSTOLIC BLOOD PRESSURE: 120 MMHG | RESPIRATION RATE: 20 BRPM

## 2021-07-13 ASSESSMENT — MIFFLIN-ST. JEOR: SCORE: 1942.06

## 2021-07-14 ENCOUNTER — TRANSITIONAL CARE UNIT VISIT (OUTPATIENT)
Dept: GERIATRICS | Facility: CLINIC | Age: 66
End: 2021-07-14
Payer: COMMERCIAL

## 2021-07-14 DIAGNOSIS — Z53.9 ERRONEOUS ENCOUNTER--DISREGARD: Primary | ICD-10-CM

## 2021-07-14 NOTE — LETTER
7/14/2021        RE: Sharmila Strain  [address] [place] [address]          This encounter was opened in error. Please disregard.        Sincerely,        KIESHA HERBERT, DO

## 2021-08-31 ENCOUNTER — LAB REQUISITION (OUTPATIENT)
Dept: LAB | Facility: CLINIC | Age: 66
End: 2021-08-31

## 2021-08-31 DIAGNOSIS — E03.9 HYPOTHYROIDISM, UNSPECIFIED: ICD-10-CM

## 2021-08-31 DIAGNOSIS — E55.9 VITAMIN D DEFICIENCY, UNSPECIFIED: ICD-10-CM

## 2021-08-31 DIAGNOSIS — I10 ESSENTIAL (PRIMARY) HYPERTENSION: ICD-10-CM

## 2021-08-31 DIAGNOSIS — E78.5 HYPERLIPIDEMIA, UNSPECIFIED: ICD-10-CM

## 2021-08-31 LAB
ALBUMIN SERPL-MCNC: 3.3 G/DL (ref 3.5–5)
ALP SERPL-CCNC: 77 U/L (ref 45–120)
ALT SERPL W P-5'-P-CCNC: 20 U/L (ref 0–45)
ANION GAP SERPL CALCULATED.3IONS-SCNC: 11 MMOL/L (ref 5–18)
AST SERPL W P-5'-P-CCNC: 14 U/L (ref 0–40)
BILIRUB SERPL-MCNC: 0.2 MG/DL (ref 0–1)
BUN SERPL-MCNC: 13 MG/DL (ref 8–22)
CALCIUM SERPL-MCNC: 9.7 MG/DL (ref 8.5–10.5)
CHLORIDE BLD-SCNC: 101 MMOL/L (ref 98–107)
CHOLEST SERPL-MCNC: 152 MG/DL
CO2 SERPL-SCNC: 27 MMOL/L (ref 22–31)
CREAT SERPL-MCNC: 0.79 MG/DL (ref 0.6–1.1)
GFR SERPL CREATININE-BSD FRML MDRD: 79 ML/MIN/1.73M2
GLUCOSE BLD-MCNC: 240 MG/DL (ref 70–125)
HDLC SERPL-MCNC: 31 MG/DL
LDLC SERPL CALC-MCNC: 76 MG/DL
POTASSIUM BLD-SCNC: 4 MMOL/L (ref 3.5–5)
PROT SERPL-MCNC: 6.2 G/DL (ref 6–8)
SODIUM SERPL-SCNC: 139 MMOL/L (ref 136–145)
TRIGL SERPL-MCNC: 226 MG/DL
TSH SERPL DL<=0.005 MIU/L-ACNC: 2.81 UIU/ML (ref 0.3–5)
URATE SERPL-MCNC: 5.6 MG/DL (ref 2–7.5)

## 2021-08-31 PROCEDURE — 84443 ASSAY THYROID STIM HORMONE: CPT | Performed by: FAMILY MEDICINE

## 2021-08-31 PROCEDURE — 84550 ASSAY OF BLOOD/URIC ACID: CPT | Performed by: FAMILY MEDICINE

## 2021-08-31 PROCEDURE — 82306 VITAMIN D 25 HYDROXY: CPT | Performed by: FAMILY MEDICINE

## 2021-08-31 PROCEDURE — 80061 LIPID PANEL: CPT | Performed by: FAMILY MEDICINE

## 2021-08-31 PROCEDURE — 80053 COMPREHEN METABOLIC PANEL: CPT | Performed by: FAMILY MEDICINE

## 2021-09-01 LAB — DEPRECATED CALCIDIOL+CALCIFEROL SERPL-MC: 39 UG/L (ref 30–80)

## 2022-01-12 VITALS — BODY MASS INDEX: 45.99 KG/M2 | WEIGHT: 293 LBS | HEIGHT: 67 IN

## 2022-01-18 VITALS
WEIGHT: 293 LBS | BODY MASS INDEX: 45.99 KG/M2 | SYSTOLIC BLOOD PRESSURE: 138 MMHG | OXYGEN SATURATION: 94 % | DIASTOLIC BLOOD PRESSURE: 76 MMHG | HEIGHT: 67 IN | HEART RATE: 80 BPM | RESPIRATION RATE: 16 BRPM

## 2022-01-31 ENCOUNTER — TRANSFERRED RECORDS (OUTPATIENT)
Dept: HEALTH INFORMATION MANAGEMENT | Facility: CLINIC | Age: 67
End: 2022-01-31
Payer: COMMERCIAL

## 2022-01-31 ENCOUNTER — LAB REQUISITION (OUTPATIENT)
Dept: LAB | Facility: CLINIC | Age: 67
End: 2022-01-31

## 2022-01-31 DIAGNOSIS — I10 ESSENTIAL (PRIMARY) HYPERTENSION: ICD-10-CM

## 2022-01-31 DIAGNOSIS — E03.9 HYPOTHYROIDISM, UNSPECIFIED: ICD-10-CM

## 2022-01-31 PROCEDURE — 84443 ASSAY THYROID STIM HORMONE: CPT | Performed by: FAMILY MEDICINE

## 2022-01-31 PROCEDURE — 82310 ASSAY OF CALCIUM: CPT | Performed by: FAMILY MEDICINE

## 2022-02-01 LAB
ANION GAP SERPL CALCULATED.3IONS-SCNC: 13 MMOL/L (ref 5–18)
BUN SERPL-MCNC: 14 MG/DL (ref 8–22)
CALCIUM SERPL-MCNC: 10.2 MG/DL (ref 8.5–10.5)
CHLORIDE BLD-SCNC: 99 MMOL/L (ref 98–107)
CO2 SERPL-SCNC: 26 MMOL/L (ref 22–31)
CREAT SERPL-MCNC: 0.84 MG/DL (ref 0.6–1.1)
GFR SERPL CREATININE-BSD FRML MDRD: 77 ML/MIN/1.73M2
GLUCOSE BLD-MCNC: 282 MG/DL (ref 70–125)
POTASSIUM BLD-SCNC: 3.7 MMOL/L (ref 3.5–5)
SODIUM SERPL-SCNC: 138 MMOL/L (ref 136–145)
TSH SERPL DL<=0.005 MIU/L-ACNC: 11.51 UIU/ML (ref 0.3–5)

## 2022-02-27 ENCOUNTER — HOSPITAL ENCOUNTER (INPATIENT)
Facility: CLINIC | Age: 67
LOS: 4 days | Discharge: HOME | DRG: 388 | End: 2022-03-03
Attending: EMERGENCY MEDICINE | Admitting: HOSPITALIST
Payer: COMMERCIAL

## 2022-02-27 ENCOUNTER — APPOINTMENT (OUTPATIENT)
Dept: CT IMAGING | Facility: CLINIC | Age: 67
DRG: 388 | End: 2022-02-27
Attending: EMERGENCY MEDICINE
Payer: COMMERCIAL

## 2022-02-27 DIAGNOSIS — J43.1 PANLOBULAR EMPHYSEMA (H): Primary | ICD-10-CM

## 2022-02-27 DIAGNOSIS — K56.609 SMALL BOWEL OBSTRUCTION (H): ICD-10-CM

## 2022-02-27 DIAGNOSIS — D72.829 LEUKOCYTOSIS, UNSPECIFIED TYPE: ICD-10-CM

## 2022-02-27 DIAGNOSIS — R11.2 NON-INTRACTABLE VOMITING WITH NAUSEA, UNSPECIFIED VOMITING TYPE: ICD-10-CM

## 2022-02-27 DIAGNOSIS — E87.20 LACTIC ACIDOSIS: ICD-10-CM

## 2022-02-27 DIAGNOSIS — R10.84 ABDOMINAL PAIN, GENERALIZED: ICD-10-CM

## 2022-02-27 LAB
ALBUMIN SERPL-MCNC: 3.5 G/DL (ref 3.5–5)
ALP SERPL-CCNC: 84 U/L (ref 45–120)
ALT SERPL W P-5'-P-CCNC: 34 U/L (ref 0–45)
ANION GAP SERPL CALCULATED.3IONS-SCNC: 14 MMOL/L (ref 5–18)
AST SERPL W P-5'-P-CCNC: 25 U/L (ref 0–40)
BASOPHILS # BLD AUTO: 0 10E3/UL (ref 0–0.2)
BASOPHILS NFR BLD AUTO: 0 %
BILIRUB DIRECT SERPL-MCNC: 0.1 MG/DL
BILIRUB SERPL-MCNC: 0.5 MG/DL (ref 0–1)
BUN SERPL-MCNC: 11 MG/DL (ref 8–22)
CALCIUM SERPL-MCNC: 8.9 MG/DL (ref 8.5–10.5)
CHLORIDE BLD-SCNC: 97 MMOL/L (ref 98–107)
CO2 SERPL-SCNC: 26 MMOL/L (ref 22–31)
CREAT SERPL-MCNC: 0.75 MG/DL (ref 0.6–1.1)
EOSINOPHIL # BLD AUTO: 0.2 10E3/UL (ref 0–0.7)
EOSINOPHIL NFR BLD AUTO: 1 %
ERYTHROCYTE [DISTWIDTH] IN BLOOD BY AUTOMATED COUNT: 13.3 % (ref 10–15)
GFR SERPL CREATININE-BSD FRML MDRD: 88 ML/MIN/1.73M2
GLUCOSE BLD-MCNC: 251 MG/DL (ref 70–125)
GLUCOSE BLDC GLUCOMTR-MCNC: 140 MG/DL (ref 70–99)
GLUCOSE BLDC GLUCOMTR-MCNC: 202 MG/DL (ref 70–99)
HBA1C MFR BLD: 7.8 %
HCT VFR BLD AUTO: 43.7 % (ref 35–47)
HGB BLD-MCNC: 14.1 G/DL (ref 11.7–15.7)
IMM GRANULOCYTES # BLD: 0.1 10E3/UL
IMM GRANULOCYTES NFR BLD: 1 %
LACTATE SERPL-SCNC: 2.6 MMOL/L (ref 0.7–2)
LIPASE SERPL-CCNC: 18 U/L (ref 0–52)
LYMPHOCYTES # BLD AUTO: 1 10E3/UL (ref 0.8–5.3)
LYMPHOCYTES NFR BLD AUTO: 6 %
MCH RBC QN AUTO: 28.7 PG (ref 26.5–33)
MCHC RBC AUTO-ENTMCNC: 32.3 G/DL (ref 31.5–36.5)
MCV RBC AUTO: 89 FL (ref 78–100)
MONOCYTES # BLD AUTO: 1 10E3/UL (ref 0–1.3)
MONOCYTES NFR BLD AUTO: 6 %
NEUTROPHILS # BLD AUTO: 14.4 10E3/UL (ref 1.6–8.3)
NEUTROPHILS NFR BLD AUTO: 86 %
NRBC # BLD AUTO: 0 10E3/UL
NRBC BLD AUTO-RTO: 0 /100
PLATELET # BLD AUTO: 371 10E3/UL (ref 150–450)
POTASSIUM BLD-SCNC: 3.9 MMOL/L (ref 3.5–5)
PROT SERPL-MCNC: 6.9 G/DL (ref 6–8)
RBC # BLD AUTO: 4.91 10E6/UL (ref 3.8–5.2)
SARS-COV-2 RNA RESP QL NAA+PROBE: NEGATIVE
SODIUM SERPL-SCNC: 137 MMOL/L (ref 136–145)
TSH SERPL DL<=0.005 MIU/L-ACNC: 0.27 UIU/ML (ref 0.3–5)
WBC # BLD AUTO: 16.7 10E3/UL (ref 4–11)

## 2022-02-27 PROCEDURE — 120N000001 HC R&B MED SURG/OB

## 2022-02-27 PROCEDURE — 258N000003 HC RX IP 258 OP 636: Performed by: HOSPITALIST

## 2022-02-27 PROCEDURE — 83036 HEMOGLOBIN GLYCOSYLATED A1C: CPT | Performed by: HOSPITALIST

## 2022-02-27 PROCEDURE — 250N000012 HC RX MED GY IP 250 OP 636 PS 637: Performed by: HOSPITALIST

## 2022-02-27 PROCEDURE — 84443 ASSAY THYROID STIM HORMONE: CPT | Performed by: HOSPITALIST

## 2022-02-27 PROCEDURE — 87635 SARS-COV-2 COVID-19 AMP PRB: CPT | Performed by: EMERGENCY MEDICINE

## 2022-02-27 PROCEDURE — 250N000011 HC RX IP 250 OP 636: Performed by: EMERGENCY MEDICINE

## 2022-02-27 PROCEDURE — 96374 THER/PROPH/DIAG INJ IV PUSH: CPT | Mod: 59

## 2022-02-27 PROCEDURE — 74177 CT ABD & PELVIS W/CONTRAST: CPT

## 2022-02-27 PROCEDURE — 83690 ASSAY OF LIPASE: CPT | Performed by: EMERGENCY MEDICINE

## 2022-02-27 PROCEDURE — 85025 COMPLETE CBC W/AUTO DIFF WBC: CPT | Performed by: EMERGENCY MEDICINE

## 2022-02-27 PROCEDURE — 83605 ASSAY OF LACTIC ACID: CPT | Performed by: EMERGENCY MEDICINE

## 2022-02-27 PROCEDURE — 82374 ASSAY BLOOD CARBON DIOXIDE: CPT | Performed by: EMERGENCY MEDICINE

## 2022-02-27 PROCEDURE — 96361 HYDRATE IV INFUSION ADD-ON: CPT

## 2022-02-27 PROCEDURE — 99285 EMERGENCY DEPT VISIT HI MDM: CPT | Mod: 25

## 2022-02-27 PROCEDURE — C9803 HOPD COVID-19 SPEC COLLECT: HCPCS

## 2022-02-27 PROCEDURE — 99223 1ST HOSP IP/OBS HIGH 75: CPT | Performed by: HOSPITALIST

## 2022-02-27 PROCEDURE — 36415 COLL VENOUS BLD VENIPUNCTURE: CPT | Performed by: EMERGENCY MEDICINE

## 2022-02-27 PROCEDURE — 250N000011 HC RX IP 250 OP 636: Performed by: HOSPITALIST

## 2022-02-27 PROCEDURE — 82248 BILIRUBIN DIRECT: CPT | Performed by: EMERGENCY MEDICINE

## 2022-02-27 PROCEDURE — 258N000003 HC RX IP 258 OP 636: Performed by: EMERGENCY MEDICINE

## 2022-02-27 RX ORDER — ALLOPURINOL 300 MG/1
300 TABLET ORAL DAILY
Status: DISCONTINUED | OUTPATIENT
Start: 2022-02-27 | End: 2022-03-03 | Stop reason: HOSPADM

## 2022-02-27 RX ORDER — SIMVASTATIN 20 MG
20 TABLET ORAL AT BEDTIME
Status: DISCONTINUED | OUTPATIENT
Start: 2022-02-27 | End: 2022-03-03 | Stop reason: HOSPADM

## 2022-02-27 RX ORDER — DEXTROSE MONOHYDRATE 25 G/50ML
25-50 INJECTION, SOLUTION INTRAVENOUS
Status: DISCONTINUED | OUTPATIENT
Start: 2022-02-27 | End: 2022-03-03 | Stop reason: HOSPADM

## 2022-02-27 RX ORDER — ESCITALOPRAM OXALATE 10 MG/1
10 TABLET ORAL DAILY
COMMUNITY
Start: 2021-09-28 | End: 2022-02-27 | Stop reason: ALTCHOICE

## 2022-02-27 RX ORDER — PROCHLORPERAZINE 25 MG
25 SUPPOSITORY, RECTAL RECTAL EVERY 12 HOURS PRN
Status: DISCONTINUED | OUTPATIENT
Start: 2022-02-27 | End: 2022-03-03 | Stop reason: HOSPADM

## 2022-02-27 RX ORDER — DULOXETIN HYDROCHLORIDE 30 MG/1
30 CAPSULE, DELAYED RELEASE ORAL DAILY
COMMUNITY
Start: 2022-01-31

## 2022-02-27 RX ORDER — MORPHINE SULFATE 4 MG/ML
4 INJECTION, SOLUTION INTRAMUSCULAR; INTRAVENOUS
Status: COMPLETED | OUTPATIENT
Start: 2022-02-27 | End: 2022-02-27

## 2022-02-27 RX ORDER — HYDROMORPHONE HCL IN WATER/PF 6 MG/30 ML
0.2 PATIENT CONTROLLED ANALGESIA SYRINGE INTRAVENOUS
Status: DISCONTINUED | OUTPATIENT
Start: 2022-02-27 | End: 2022-03-02

## 2022-02-27 RX ORDER — NALOXONE HYDROCHLORIDE 0.4 MG/ML
0.4 INJECTION, SOLUTION INTRAMUSCULAR; INTRAVENOUS; SUBCUTANEOUS
Status: DISCONTINUED | OUTPATIENT
Start: 2022-02-27 | End: 2022-03-03 | Stop reason: HOSPADM

## 2022-02-27 RX ORDER — HYDROCODONE BITARTRATE AND ACETAMINOPHEN 7.5; 325 MG/1; MG/1
1 TABLET ORAL 3 TIMES DAILY
Status: DISCONTINUED | OUTPATIENT
Start: 2022-02-27 | End: 2022-03-03 | Stop reason: HOSPADM

## 2022-02-27 RX ORDER — HYDRALAZINE HYDROCHLORIDE 20 MG/ML
10 INJECTION INTRAMUSCULAR; INTRAVENOUS EVERY 6 HOURS PRN
Status: DISCONTINUED | OUTPATIENT
Start: 2022-02-27 | End: 2022-03-03 | Stop reason: HOSPADM

## 2022-02-27 RX ORDER — NALOXONE HYDROCHLORIDE 0.4 MG/ML
0.2 INJECTION, SOLUTION INTRAMUSCULAR; INTRAVENOUS; SUBCUTANEOUS
Status: DISCONTINUED | OUTPATIENT
Start: 2022-02-27 | End: 2022-03-03 | Stop reason: HOSPADM

## 2022-02-27 RX ORDER — IOPAMIDOL 755 MG/ML
100 INJECTION, SOLUTION INTRAVASCULAR ONCE
Status: COMPLETED | OUTPATIENT
Start: 2022-02-27 | End: 2022-02-27

## 2022-02-27 RX ORDER — BUDESONIDE AND FORMOTEROL FUMARATE DIHYDRATE 160; 4.5 UG/1; UG/1
2 AEROSOL RESPIRATORY (INHALATION) DAILY
Status: DISCONTINUED | OUTPATIENT
Start: 2022-02-28 | End: 2022-02-28

## 2022-02-27 RX ORDER — ASPIRIN 81 MG/1
81 TABLET ORAL DAILY
Status: DISCONTINUED | OUTPATIENT
Start: 2022-02-27 | End: 2022-03-03 | Stop reason: HOSPADM

## 2022-02-27 RX ORDER — LEVOTHYROXINE SODIUM 50 UG/1
150 TABLET ORAL DAILY
Status: DISCONTINUED | OUTPATIENT
Start: 2022-02-28 | End: 2022-03-03 | Stop reason: HOSPADM

## 2022-02-27 RX ORDER — NICOTINE POLACRILEX 4 MG
15-30 LOZENGE BUCCAL
Status: DISCONTINUED | OUTPATIENT
Start: 2022-02-27 | End: 2022-03-03 | Stop reason: HOSPADM

## 2022-02-27 RX ORDER — PROCHLORPERAZINE MALEATE 10 MG
10 TABLET ORAL EVERY 6 HOURS PRN
Status: DISCONTINUED | OUTPATIENT
Start: 2022-02-27 | End: 2022-03-03 | Stop reason: HOSPADM

## 2022-02-27 RX ORDER — DULOXETIN HYDROCHLORIDE 30 MG/1
30 CAPSULE, DELAYED RELEASE ORAL DAILY
Status: DISCONTINUED | OUTPATIENT
Start: 2022-02-27 | End: 2022-03-03 | Stop reason: HOSPADM

## 2022-02-27 RX ORDER — ONDANSETRON 2 MG/ML
4 INJECTION INTRAMUSCULAR; INTRAVENOUS EVERY 6 HOURS PRN
Status: DISCONTINUED | OUTPATIENT
Start: 2022-02-27 | End: 2022-03-03 | Stop reason: HOSPADM

## 2022-02-27 RX ORDER — ONDANSETRON 2 MG/ML
4 INJECTION INTRAMUSCULAR; INTRAVENOUS
Status: COMPLETED | OUTPATIENT
Start: 2022-02-27 | End: 2022-02-27

## 2022-02-27 RX ORDER — ALBUTEROL SULFATE 90 UG/1
2 AEROSOL, METERED RESPIRATORY (INHALATION) EVERY 6 HOURS PRN
Status: DISCONTINUED | OUTPATIENT
Start: 2022-02-27 | End: 2022-03-03 | Stop reason: HOSPADM

## 2022-02-27 RX ORDER — LIDOCAINE 40 MG/G
CREAM TOPICAL
Status: DISCONTINUED | OUTPATIENT
Start: 2022-02-27 | End: 2022-03-03 | Stop reason: HOSPADM

## 2022-02-27 RX ORDER — LEVOTHYROXINE SODIUM 150 UG/1
150 TABLET ORAL DAILY
COMMUNITY
Start: 2022-02-09

## 2022-02-27 RX ORDER — SODIUM CHLORIDE 9 MG/ML
INJECTION, SOLUTION INTRAVENOUS CONTINUOUS
Status: DISCONTINUED | OUTPATIENT
Start: 2022-02-27 | End: 2022-03-02

## 2022-02-27 RX ORDER — SODIUM CHLORIDE 9 MG/ML
INJECTION, SOLUTION INTRAVENOUS CONTINUOUS
Status: DISCONTINUED | OUTPATIENT
Start: 2022-02-27 | End: 2022-02-28

## 2022-02-27 RX ORDER — ONDANSETRON 4 MG/1
4 TABLET, ORALLY DISINTEGRATING ORAL EVERY 6 HOURS PRN
Status: DISCONTINUED | OUTPATIENT
Start: 2022-02-27 | End: 2022-03-03 | Stop reason: HOSPADM

## 2022-02-27 RX ORDER — LOSARTAN POTASSIUM AND HYDROCHLOROTHIAZIDE 25; 100 MG/1; MG/1
1 TABLET ORAL DAILY
COMMUNITY
Start: 2021-11-30

## 2022-02-27 RX ADMIN — INSULIN ASPART 1 UNITS: 100 INJECTION, SOLUTION INTRAVENOUS; SUBCUTANEOUS at 18:48

## 2022-02-27 RX ADMIN — MORPHINE SULFATE 4 MG: 4 INJECTION, SOLUTION INTRAMUSCULAR; INTRAVENOUS at 17:38

## 2022-02-27 RX ADMIN — ONDANSETRON 4 MG: 2 INJECTION INTRAMUSCULAR; INTRAVENOUS at 18:38

## 2022-02-27 RX ADMIN — IOPAMIDOL 100 ML: 755 INJECTION, SOLUTION INTRAVENOUS at 16:46

## 2022-02-27 RX ADMIN — SODIUM CHLORIDE: 9 INJECTION, SOLUTION INTRAVENOUS at 15:51

## 2022-02-27 RX ADMIN — HYDROMORPHONE HYDROCHLORIDE 0.2 MG: 0.2 INJECTION, SOLUTION INTRAMUSCULAR; INTRAVENOUS; SUBCUTANEOUS at 20:17

## 2022-02-27 RX ADMIN — HYDROMORPHONE HYDROCHLORIDE 0.2 MG: 0.2 INJECTION, SOLUTION INTRAMUSCULAR; INTRAVENOUS; SUBCUTANEOUS at 22:21

## 2022-02-27 RX ADMIN — SODIUM CHLORIDE: 9 INJECTION, SOLUTION INTRAVENOUS at 18:54

## 2022-02-27 ASSESSMENT — ACTIVITIES OF DAILY LIVING (ADL)
ADLS_ACUITY_SCORE: 7
HEARING_DIFFICULTY_OR_DEAF: NO
DEPENDENT_IADLS:: CLEANING;SHOPPING;MEDICATION MANAGEMENT;TRANSPORTATION
TRANSFERRING: 0-->INDEPENDENT
TRANSFERRING: 0-->INDEPENDENT
CHANGE_IN_FUNCTIONAL_STATUS_SINCE_ONSET_OF_CURRENT_ILLNESS/INJURY: NO
ADLS_ACUITY_SCORE: 12
WEAR_GLASSES_OR_BLIND: YES
DIFFICULTY_EATING/SWALLOWING: NO
FALL_HISTORY_WITHIN_LAST_SIX_MONTHS: NO
WALKING_OR_CLIMBING_STAIRS_DIFFICULTY: YES
VISION_MANAGEMENT: WEARS GLASSES
ADLS_ACUITY_SCORE: 14
WALKING_OR_CLIMBING_STAIRS: OTHER (SEE COMMENTS)
CONCENTRATING,_REMEMBERING_OR_MAKING_DECISIONS_DIFFICULTY: NO
DIFFICULTY_COMMUNICATING: NO
ADLS_ACUITY_SCORE: 14
DRESSING/BATHING_DIFFICULTY: NO
TOILETING_ISSUES: NO
ADLS_ACUITY_SCORE: 12
DOING_ERRANDS_INDEPENDENTLY_DIFFICULTY: NO
EQUIPMENT_CURRENTLY_USED_AT_HOME: CANE, QUAD
ADLS_ACUITY_SCORE: 14

## 2022-02-27 NOTE — ED TRIAGE NOTES
Pt comes by EMS from home due to rt abd pain. Hx of Bowl obstructions X4, alert oriented, 8/10 morpine and zofran given, now 5/10, chronic Vicodin but stopped taking it.,  HTN, O2 sats 85 placed on 2 liters

## 2022-02-27 NOTE — PHARMACY-ADMISSION MEDICATION HISTORY
Pharmacy Note - Admission Medication History    Pertinent Provider Information: None     ______________________________________________________________________    Prior To Admission (PTA) med list completed and updated in EMR.       PTA Med List   Medication Sig Last Dose     albuterol (PROAIR HFA;PROVENTIL HFA;VENTOLIN HFA) 90 mcg/actuation inhaler [ALBUTEROL (PROAIR HFA;PROVENTIL HFA;VENTOLIN HFA) 90 MCG/ACTUATION INHALER] Inhale 2 puffs every 6 (six) hours as needed for wheezing.  Past Month at Has with     allopurinoL (ZYLOPRIM) 300 MG tablet [ALLOPURINOL (ZYLOPRIM) 300 MG TABLET] Take 300 mg by mouth daily. 2/26/2022 at Unknown time     aspirin (ASA) 81 MG EC tablet Take 81 mg by mouth daily 2/26/2022 at Unknown time     budesonide-formoterol (SYMBICORT) 160-4.5 mcg/actuation inhaler [BUDESONIDE-FORMOTEROL (SYMBICORT) 160-4.5 MCG/ACTUATION INHALER] Inhale 2 puffs every morning.  Past Week at Has with     diphenhydrAMINE (BENADRYL) 25 mg capsule Take 25 mg by mouth every 6 hours as needed for itching  Unknown at Unknown time     DULoxetine (CYMBALTA) 30 MG capsule Take 30 mg by mouth daily 2/26/2022 at Unknown time     HYDROcodone-acetaminophen (NORCO) 7.5-325 mg per tablet [HYDROCODONE-ACETAMINOPHEN (NORCO) 7.5-325 MG PER TABLET] Take 1 tablet by mouth 3 (three) times a day.  Past Week at Unknown time     levothyroxine (SYNTHROID/LEVOTHROID) 150 MCG tablet Take 150 mcg by mouth daily 2/27/2022 at Unknown time     losartan-hydrochlorothiazide (HYZAAR) 100-25 MG tablet Take 1 tablet by mouth daily 2/26/2022 at Unknown time     nitroglycerin (NITROSTAT) 0.4 MG SL tablet [NITROGLYCERIN (NITROSTAT) 0.4 MG SL TABLET] Place 1 tablet (0.4 mg total) under the tongue daily as needed for chest pain. More than a month at Unknown time     simvastatin (ZOCOR) 20 MG tablet [SIMVASTATIN (ZOCOR) 20 MG TABLET] Take 20 mg by mouth at bedtime. 2/26/2022 at Unknown time       Information source(s): Patient, Patient's pharmacy,  Clinic records and Prescription bottles  Method of interview communication: in-person    Summary of Changes to PTA Med List  New: None  Discontinued: None  Changed: None    Patient was asked about OTC/herbal products specifically.  PTA med list reflects this.    In the past week, patient estimated taking medication this percent of the time:  greater than 90%.    Allergies were reviewed, assessed, and updated with the patient.      Medications available for use during hospital stay: inhalers .     The information provided in this note is only as accurate as the sources available at the time of the update(s).    Thank you for the opportunity to participate in the care of this patient.    [facility]  2/27/2022 4:13 PM     [phone]

## 2022-02-27 NOTE — H&P
"Hospital Medicine Service History and Physical  [facility]: [facility]    Sharmila Shore is a 66 year old female who  has a past medical history of CAD (coronary artery disease), Chronic pain syndrome, COPD (chronic obstructive pulmonary disease) (H), COPD (chronic obstructive pulmonary disease) (H), Disease of thyroid gland, Hyperlipidemia, Hypertension, Hypothyroidism, OAB (overactive bladder), Obesity, and Type 2 diabetes mellitus without complication, without long-term current use of insulin (H) (6/25/2021).   Chief Complaint: Abdominal pain    Assessment and Plan  Small Bowel obstruction  Dehydration  Recurrent episodes in the past  NPO, increase MIVF  Prn dilaudid  Check TSH    Lactic acidosis  Initial lactate 2.6  On NS in ED, increasing IVF  Well appearing  Do not suspect sepsis at this time, recheck if clinically deteriorating    CAD  HLD  On statin PTA  Last Echo 60% (2017), CTA angio 2017 showed minimal plaque  PTA aspirin  hasn't needed her nitrostat at home essentially ever, CP improves with antacids at home    COPD  Uses albuterol for exertion   PTA symbicort    T2DM  Initial blood glucose 251  Hold metformin after CT  NPO w mild sliding scale insulin ordered    HTN  Holding hyzaar given NPO  Prn IV hydralazine ordered    Left adrenal adenoma  Noted on imaging  Clinically may be secretory (cortisol, aldosterone)  Defer dexamethasone suppression until pt's SBO is improved    BMI 47  Hepatic steatosis    DVTP: she prefers ambulation, doesn't want AC PPx  Code Status: No Order Full  Disposition: Inpatient   Estimated body mass index is 47.16 kg/m  as calculated from the following:    Height as of this encounter: 1.676 m (5' 6\").    Weight as of this encounter: 132.5 kg (292 lb 3.2 oz).    History of Present Illness  Sharmila Shore presents with right-sided abdominal pain that began last night with nausea which felt similar to prior episodes of SBO. She tried prune juice and \"I kept eating " "like a dope\". She called EMS after emesis that \"tasted like poop\". She does not like the taste of gastrografin. Has recently quit smoking and subsequently gained \"a lot of weight\" and says she became diabetic thereafter. She says she hasn't had a UTI since breaking up with boyfriend 2 years ago.  ROS + nausea, vomiting (feculent), abd pain  ROS - SOB, CP, dysuria  All other systems reviewed and are negative  In the ED, patient is hemodynamically stable and afebrile.  Has leukocytosis 16.7K.  CT shows mid small bowel focus representing adynamic ileus or mild obstruction    Appears NAD, laughing during conversation  Anicteric conjunctiva, PERRL, glasses  Semi-dry mucous membranes, intact dentition  Trachea midline, no jvd  cta, Respiratory effort normal  rrr, no murmur  Abdomen soft, somewhat distended, ttp  no edema, clubbing of nails absent  Skin normal temperature, dry, tattooed  normal affect, alert  Vital signs reviewed by me    Wt Readings from Last 4 Encounters:   02/27/22 132.5 kg (292 lb 3.2 oz)   07/13/21 135.9 kg (299 lb 11.2 oz)   06/22/21 140.6 kg (310 lb)   06/22/21 140.6 kg (310 lb)        reports that she quit smoking about 3 years ago. She has never used smokeless tobacco. She reports that she does not drink alcohol and does not use drugs.  family history includes Coronary Artery Disease in her father.   has a past surgical history that includes appendectomy; Colon surgery; hernia repair; Hysterectomy; Cardiac catheterization; tubal ligation; and STOVER W/O FACETEC FORAMOT/[facility] 1/2 VRT SEG, LUMBAR (Right, 6/21/2021).   Allergies   Allergen Reactions     Amoxicillin-Pot Clavulanate [Augmentin] Other (See Comments)     Stomach upset.     Azithromycin Hives     Macrobid [Nitrofurantoin] Hives     Morphine Headache     Oxycodone Itching     In Percocet. Okay with acetaminophen.      Sulfa (Sulfonamide Antibiotics) [Sulfa Drugs] Hives     Gabapentin Rash       Dayday Domingo MD, MPH  [facility] " [facility]   Phone: #[phone]

## 2022-02-27 NOTE — ED PROVIDER NOTES
EMERGENCY DEPARTMENT ENCOUNTER      NAME: Sharmila Shore  AGE: 66 year old female  YOB: 1955  MRN: [id number]  EVALUATION DATE & TIME: 2/27/2022  3:15 PM    PCP: Lex Gauthier    ED PROVIDER: Mimi Zazueta MD    Chief Complaint   Patient presents with     Abdominal Pain         FINAL IMPRESSION:  1. Small bowel obstruction (H)    2. Lactic acidosis    3. Leukocytosis, unspecified type    4. Non-intractable vomiting with nausea, unspecified vomiting type    5. Abdominal pain, generalized          ED COURSE & MEDICAL DECISION MAKING:    Pertinent Labs & Imaging studies reviewed. (See chart for details)  66 year old female with history of CAD, COPD, HTN, HLD, DM2 and recurrent bowel obstructions who presents to the Emergency Department for evaluation of right-sided abdominal pain now diffuse throughout the abdomen since this morning with nausea, vomiting. Patient describing feculent emesis. Multiple previous abdominal surgeries and SBO's thankfully which have been managed medically in the past, given description of feculent emesis strong suspicion for recurrent bowel obstruction, volvulus, mass. Differential includes GERD, gastritis, pancreatitis, hepatobiliary pathology. She is status post appendectomy.    Patient placed on monitor, IV established and blood obtained. Placed on IV fluids, made n.p.o. CBC, BMP, LFTs, lipase, lactate, Covid swab notable for leukocytosis with WBC of 16.7, lactate of 2.6 and mild hyperglycemia with glucose of 251. CT abdomen pelvis shows evidence of mildly prominent small bowel focal adynamic ileus per mild obstruction.  Left adrenal 1.5 cm adenoma.  Given morphine for ongoing pain will be admitted for n.p.o., bowel rest.       ED Course as of 02/27/22 1736   Sun Feb 27, 2022   1557 Lactic Acid(!): 2.6   1602 WBC(!): 16.7   1632 Pulse: 108       3:23 PM I met with the patient to gather history and to perform my initial exam. I discussed the plan for care while in the  "Emergency Department. PPE (gloves, surgical mask) was worn during patient encounters.   5:33 PM I updated patient on imaging and lab results.       At the conclusion of the encounter I discussed the results of all of the tests and the disposition. The questions were answered. The patient or family acknowledged understanding and was agreeable with the care plan.    MEDICATIONS GIVEN IN THE EMERGENCY:  Medications   sodium chloride 0.9% infusion ( Intravenous New Bag 2/27/22 6087)   ondansetron (ZOFRAN) injection 4 mg (has no administration in time range)   morphine (PF) injection 4 mg (has no administration in time range)   iopamidol (ISOVUE-370) solution 100 mL (100 mLs Intravenous Given 2/27/22 8246)       NEW PRESCRIPTIONS STARTED AT TODAY'S ER VISIT  New Prescriptions    No medications on file          =================================================================    HPI    Patient information was obtained from: Patient and EMS    Use of Intrepreter: N/A       Sharmila Shore is a 66 year old female with pertinent medical history of hypertension, hyperlipidemia, DM II, hypothyroidism, CAD, COPD, small bowel obstruction, and chronic pain syndrome who presents by EMS for evaluation of abdominal pain.     Per EMS, patient had sudden onset of right sided abdominal pain around 0900 (~6.5 hours ago). Patient is on Vicodin chronically and a stool softner. She was given 5 mg morphine and 4 mg of Zofran en route. Patient's blood glucose was 258. She was stating 85%, so she was placed on 2L of oxygen and now stating 95%.     Patient reports that yesterday she developed some mild right upper quadrant abdominal pain. However, this morning when she awoke, she had diffuse severe abdominal pain. Then she became nauseous and had episodes of emesis. When asked what it looks like, patient states it was like \"bile\", but when she elaborated she states \"it's my own feces\". Patient believes she has a bowel obstruction as she has a " history of four previous small bowel obstructions and this episode is similar in nature. Patient has had little gas and has been constipated for the past 2 days.     She has a history of appendectomy, hernia repairs, and hysterectomy. Denies any medication changes. She is a former smoker.       REVIEW OF SYSTEMS  Constitutional:  Denies fever, chills, weight loss or weakness  Respiratory: No SOB, wheeze or cough  Cardiovascular:  No CP, palpitations  GI:  Denies diarrhea. Endorses abdominal pain, nausea, vomiting, and constipation.  : Denies dysuria, denies hematuria  All other systems negative unless noted in HPI.      PAST MEDICAL HISTORY:  Past Medical History:   Diagnosis Date     CAD (coronary artery disease)      Chronic pain syndrome      COPD (chronic obstructive pulmonary disease) (H)      COPD (chronic obstructive pulmonary disease) (H)      Disease of thyroid gland      Hyperlipidemia      Hypertension      Hypothyroidism      OAB (overactive bladder)      Obesity      Type 2 diabetes mellitus without complication, without long-term current use of insulin (H) 6/25/2021       PAST SURGICAL HISTORY:  Past Surgical History:   Procedure Laterality Date     APPENDECTOMY       CARDIAC CATHETERIZATION       COLON SURGERY       HERNIA REPAIR       HYSTERECTOMY       TUBAL LIGATION       ZZC STOVER W/O FACETEC FORAMOT/[facility] 1/2 VRT SEG, LUMBAR Right 6/21/2021    Procedure: RIGHT LUMBAR 4-LUMBAR 5 HEMILAMINECTOMY, MEDIAL FACETECTOMY, FORAMINOTOMY, MICRODISCECTOMY;  Surgeon: Celnia Hyde MD;  Location: [facility];  Service: Spine       CURRENT MEDICATIONS:    Prior to Admission Medications   Prescriptions Last Dose Informant Patient Reported? Taking?   DULoxetine (CYMBALTA) 30 MG capsule 2/26/2022 at Unknown time  Yes Yes   Sig: Take 30 mg by mouth daily   HYDROcodone-acetaminophen (NORCO) 7.5-325 mg per tablet Past Week at Unknown time  Yes Yes   Sig: [HYDROCODONE-ACETAMINOPHEN (NORCO) 7.5-325 MG PER  TABLET] Take 1 tablet by mouth 3 (three) times a day.    albuterol (PROAIR HFA;PROVENTIL HFA;VENTOLIN HFA) 90 mcg/actuation inhaler Past Month at Has with  Yes Yes   Sig: [ALBUTEROL (PROAIR HFA;PROVENTIL HFA;VENTOLIN HFA) 90 MCG/ACTUATION INHALER] Inhale 2 puffs every 6 (six) hours as needed for wheezing.    allopurinoL (ZYLOPRIM) 300 MG tablet 2/26/2022 at Unknown time  Yes Yes   Sig: [ALLOPURINOL (ZYLOPRIM) 300 MG TABLET] Take 300 mg by mouth daily.   aspirin (ASA) 81 MG EC tablet 2/26/2022 at Unknown time  Yes Yes   Sig: Take 81 mg by mouth daily   budesonide-formoterol (SYMBICORT) 160-4.5 mcg/actuation inhaler Past Week at Has with  Yes Yes   Sig: [BUDESONIDE-FORMOTEROL (SYMBICORT) 160-4.5 MCG/ACTUATION INHALER] Inhale 2 puffs every morning.    diphenhydrAMINE (BENADRYL) 25 mg capsule Unknown at Unknown time  Yes Yes   Sig: Take 25 mg by mouth every 6 hours as needed for itching    levothyroxine (SYNTHROID/LEVOTHROID) 150 MCG tablet 2/27/2022 at Unknown time  Yes Yes   Sig: Take 150 mcg by mouth daily   losartan-hydrochlorothiazide (HYZAAR) 100-25 MG tablet 2/26/2022 at Unknown time  Yes Yes   Sig: Take 1 tablet by mouth daily   nitroglycerin (NITROSTAT) 0.4 MG SL tablet More than a month at Unknown time  No Yes   Sig: [NITROGLYCERIN (NITROSTAT) 0.4 MG SL TABLET] Place 1 tablet (0.4 mg total) under the tongue daily as needed for chest pain.   simvastatin (ZOCOR) 20 MG tablet 2/26/2022 at Unknown time  Yes Yes   Sig: [SIMVASTATIN (ZOCOR) 20 MG TABLET] Take 20 mg by mouth at bedtime.      Facility-Administered Medications: None       ALLERGIES:  Allergies   Allergen Reactions     Amoxicillin-Pot Clavulanate [Augmentin] Other (See Comments)     Stomach upset.     Azithromycin Hives     Macrobid [Nitrofurantoin] Hives     Morphine Headache     Oxycodone Itching     In Percocet. Okay with acetaminophen.      Sulfa (Sulfonamide Antibiotics) [Sulfa Drugs] Hives     Gabapentin Rash       FAMILY HISTORY:  Family History  "  Problem Relation Age of Onset     Coronary Artery Disease Father        SOCIAL HISTORY:  Social History     Tobacco Use     Smoking status: Former Smoker     Quit date: 11/26/2018     Years since quitting: 3.2     Smokeless tobacco: Never Used   Substance Use Topics     Alcohol use: No     Drug use: No        VITALS:  Patient Vitals for the past 24 hrs:   BP Temp Temp src Pulse Resp SpO2 Height Weight   02/27/22 1630 -- -- -- 100 -- 94 % -- --   02/27/22 1600 (!) 165/70 -- -- 100 -- 92 % -- --   02/27/22 1545 (!) 158/70 -- -- 101 -- 95 % -- --   02/27/22 1528 (!) 191/86 98.4  F (36.9  C) Oral 108 20 95 % 1.676 m (5' 6\") 132.5 kg (292 lb 3.2 oz)       PHYSICAL EXAM    General Appearance: Well-appearing, well-nourished, no acute distress, morbidly obese  Head:  Normocephalic  Eyes:   conjunctiva/corneas clear  ENT:  membranes are moist without pallor  Neck:  Supple  Cardio: Borderline tachycardic in the 100s, regular rhythm, no murmur/gallop/rub  Pulm:  No respiratory distress, clear to auscultation bilaterally  Back:  No CVA tenderness, normal ROM  Abdomen:  Soft, slightly distended, no reproducible pain, but this was after a dose of morphine,no rebound or guarding.  Extremities: Extremities atraumatic. Trace lower extremity edema  Skin:  Skin warm, dry, no rashes  Neuro:  Alert and oriented ×3, moving all extremities, no gross sensory defects     RADIOLOGY/LABS:  Reviewed all pertinent imaging. Please see official radiology report. All pertinent labs reviewed and interpreted.    Results for orders placed or performed during the hospital encounter of 02/27/22   Abd/pelvis CT,  IV  contrast only TRAUMA / AAA    Impression    IMPRESSION:   1.  Mildly prominent mid small bowel could represent focal adynamic ileus, or mild obstruction.  2.  Small amount adjacent mesenteric edema or scar.  3.  Chronic hepatic steatosis.  4.  Left adrenal 1.5 cm adenoma.   Basic metabolic panel   Result Value Ref Range    Sodium 137 136 " - 145 mmol/L    Potassium 3.9 3.5 - 5.0 mmol/L    Chloride 97 (L) 98 - 107 mmol/L    Carbon Dioxide (CO2) 26 22 - 31 mmol/L    Anion Gap 14 5 - 18 mmol/L    Urea Nitrogen 11 8 - 22 mg/dL    Creatinine 0.75 0.60 - 1.10 mg/dL    Calcium 8.9 8.5 - 10.5 mg/dL    Glucose 251 (H) 70 - 125 mg/dL    GFR Estimate 88 >60 mL/min/1.73m2   Hepatic panel   Result Value Ref Range    Bilirubin Total 0.5 0.0 - 1.0 mg/dL    Bilirubin Direct 0.1 <=0.5 mg/dL    Protein Total 6.9 6.0 - 8.0 g/dL    Albumin 3.5 3.5 - 5.0 g/dL    Alkaline Phosphatase 84 45 - 120 U/L    AST 25 0 - 40 U/L    ALT 34 0 - 45 U/L   Result Value Ref Range    Lipase 18 0 - 52 U/L   Lactic acid whole blood   Result Value Ref Range    Lactic Acid 2.6 (H) 0.7 - 2.0 mmol/L   Asymptomatic COVID-19 Virus (Coronavirus) by PCR Nasopharyngeal    Specimen: Nasopharyngeal; Swab   Result Value Ref Range    SARS CoV2 PCR Negative Negative   CBC with platelets and differential   Result Value Ref Range    WBC Count 16.7 (H) 4.0 - 11.0 10e3/uL    RBC Count 4.91 3.80 - 5.20 10e6/uL    Hemoglobin 14.1 11.7 - 15.7 g/dL    Hematocrit 43.7 35.0 - 47.0 %    MCV 89 78 - 100 fL    MCH 28.7 26.5 - 33.0 pg    MCHC 32.3 31.5 - 36.5 g/dL    RDW 13.3 10.0 - 15.0 %    Platelet Count 371 150 - 450 10e3/uL    % Neutrophils 86 %    % Lymphocytes 6 %    % Monocytes 6 %    % Eosinophils 1 %    % Basophils 0 %    % Immature Granulocytes 1 %    NRBCs per 100 WBC 0 <1 /100    Absolute Neutrophils 14.4 (H) 1.6 - 8.3 10e3/uL    Absolute Lymphocytes 1.0 0.8 - 5.3 10e3/uL    Absolute Monocytes 1.0 0.0 - 1.3 10e3/uL    Absolute Eosinophils 0.2 0.0 - 0.7 10e3/uL    Absolute Basophils 0.0 0.0 - 0.2 10e3/uL    Absolute Immature Granulocytes 0.1 <=0.4 10e3/uL    Absolute NRBCs 0.0 10e3/uL       The creation of this record is based on the scribe s observations of the work being performed by Mimi Zazueta MD and the provider s statements to them. It was created on his behalf by Philly Carty, a trained  medical scribe. This document has been checked and approved by the attending provider.    Mimi Zazueta MD  Emergency Medicine  [facility] EMERGENCY ROOM  [address] [id number] [phone]  Dept: [phone]       Mimi Zazueta MD  02/27/22 1893

## 2022-02-28 LAB
ERYTHROCYTE [DISTWIDTH] IN BLOOD BY AUTOMATED COUNT: 13.6 % (ref 10–15)
GLUCOSE BLDC GLUCOMTR-MCNC: 116 MG/DL (ref 70–99)
GLUCOSE BLDC GLUCOMTR-MCNC: 127 MG/DL (ref 70–99)
GLUCOSE BLDC GLUCOMTR-MCNC: 138 MG/DL (ref 70–99)
GLUCOSE BLDC GLUCOMTR-MCNC: 142 MG/DL (ref 70–99)
GLUCOSE BLDC GLUCOMTR-MCNC: 150 MG/DL (ref 70–99)
HCT VFR BLD AUTO: 43 % (ref 35–47)
HGB BLD-MCNC: 13.4 G/DL (ref 11.7–15.7)
LACTATE SERPL-SCNC: 2 MMOL/L (ref 0.7–2)
MCH RBC QN AUTO: 28.9 PG (ref 26.5–33)
MCHC RBC AUTO-ENTMCNC: 31.2 G/DL (ref 31.5–36.5)
MCV RBC AUTO: 93 FL (ref 78–100)
PLATELET # BLD AUTO: 372 10E3/UL (ref 150–450)
RBC # BLD AUTO: 4.64 10E6/UL (ref 3.8–5.2)
WBC # BLD AUTO: 12.3 10E3/UL (ref 4–11)

## 2022-02-28 PROCEDURE — 250N000009 HC RX 250: Performed by: FAMILY MEDICINE

## 2022-02-28 PROCEDURE — 250N000011 HC RX IP 250 OP 636: Performed by: HOSPITALIST

## 2022-02-28 PROCEDURE — 250N000013 HC RX MED GY IP 250 OP 250 PS 637: Performed by: FAMILY MEDICINE

## 2022-02-28 PROCEDURE — 93010 ELECTROCARDIOGRAM REPORT: CPT | Performed by: INTERNAL MEDICINE

## 2022-02-28 PROCEDURE — 250N000013 HC RX MED GY IP 250 OP 250 PS 637: Performed by: HOSPITALIST

## 2022-02-28 PROCEDURE — 258N000003 HC RX IP 258 OP 636: Performed by: HOSPITALIST

## 2022-02-28 PROCEDURE — 93005 ELECTROCARDIOGRAM TRACING: CPT

## 2022-02-28 PROCEDURE — 250N000011 HC RX IP 250 OP 636: Performed by: FAMILY MEDICINE

## 2022-02-28 PROCEDURE — 83605 ASSAY OF LACTIC ACID: CPT | Performed by: FAMILY MEDICINE

## 2022-02-28 PROCEDURE — 258N000003 HC RX IP 258 OP 636: Performed by: FAMILY MEDICINE

## 2022-02-28 PROCEDURE — C9113 INJ PANTOPRAZOLE SODIUM, VIA: HCPCS | Performed by: FAMILY MEDICINE

## 2022-02-28 PROCEDURE — 120N000001 HC R&B MED SURG/OB

## 2022-02-28 PROCEDURE — 99232 SBSQ HOSP IP/OBS MODERATE 35: CPT | Performed by: FAMILY MEDICINE

## 2022-02-28 PROCEDURE — 36415 COLL VENOUS BLD VENIPUNCTURE: CPT | Performed by: FAMILY MEDICINE

## 2022-02-28 PROCEDURE — 85027 COMPLETE CBC AUTOMATED: CPT | Performed by: FAMILY MEDICINE

## 2022-02-28 RX ORDER — LOSARTAN POTASSIUM AND HYDROCHLOROTHIAZIDE 25; 100 MG/1; MG/1
1 TABLET ORAL DAILY
Status: DISCONTINUED | OUTPATIENT
Start: 2022-02-28 | End: 2022-03-03 | Stop reason: HOSPADM

## 2022-02-28 RX ORDER — ACETAMINOPHEN 325 MG/1
975 TABLET ORAL EVERY 8 HOURS PRN
Status: DISCONTINUED | OUTPATIENT
Start: 2022-02-28 | End: 2022-03-03 | Stop reason: HOSPADM

## 2022-02-28 RX ADMIN — HYDROMORPHONE HYDROCHLORIDE 0.2 MG: 0.2 INJECTION, SOLUTION INTRAMUSCULAR; INTRAVENOUS; SUBCUTANEOUS at 10:57

## 2022-02-28 RX ADMIN — SODIUM CHLORIDE: 9 INJECTION, SOLUTION INTRAVENOUS at 09:40

## 2022-02-28 RX ADMIN — HYDROMORPHONE HYDROCHLORIDE 0.2 MG: 0.2 INJECTION, SOLUTION INTRAMUSCULAR; INTRAVENOUS; SUBCUTANEOUS at 19:38

## 2022-02-28 RX ADMIN — HYDROMORPHONE HYDROCHLORIDE 0.2 MG: 0.2 INJECTION, SOLUTION INTRAMUSCULAR; INTRAVENOUS; SUBCUTANEOUS at 05:51

## 2022-02-28 RX ADMIN — HYDROMORPHONE HYDROCHLORIDE 0.2 MG: 0.2 INJECTION, SOLUTION INTRAMUSCULAR; INTRAVENOUS; SUBCUTANEOUS at 21:58

## 2022-02-28 RX ADMIN — SODIUM CHLORIDE: 9 INJECTION, SOLUTION INTRAVENOUS at 19:34

## 2022-02-28 RX ADMIN — LOSARTAN POTASSIUM AND HYDROCHLOROTHIAZIDE 1 TABLET: 100; 25 TABLET, FILM COATED ORAL at 09:37

## 2022-02-28 RX ADMIN — HYDROMORPHONE HYDROCHLORIDE 0.2 MG: 0.2 INJECTION, SOLUTION INTRAMUSCULAR; INTRAVENOUS; SUBCUTANEOUS at 01:13

## 2022-02-28 RX ADMIN — PROCHLORPERAZINE EDISYLATE 10 MG: 5 INJECTION INTRAMUSCULAR; INTRAVENOUS at 10:41

## 2022-02-28 RX ADMIN — FLUTICASONE FUROATE AND VILANTEROL TRIFENATATE 1 PUFF: 200; 25 POWDER RESPIRATORY (INHALATION) at 09:38

## 2022-02-28 RX ADMIN — SODIUM CHLORIDE: 9 INJECTION, SOLUTION INTRAVENOUS at 02:31

## 2022-02-28 RX ADMIN — ONDANSETRON 4 MG: 2 INJECTION INTRAMUSCULAR; INTRAVENOUS at 01:22

## 2022-02-28 RX ADMIN — SIMVASTATIN 20 MG: 20 TABLET, FILM COATED ORAL at 22:05

## 2022-02-28 RX ADMIN — ACETAMINOPHEN 975 MG: 325 TABLET ORAL at 19:31

## 2022-02-28 RX ADMIN — ONDANSETRON 4 MG: 2 INJECTION INTRAMUSCULAR; INTRAVENOUS at 08:12

## 2022-02-28 RX ADMIN — HYDROMORPHONE HYDROCHLORIDE 0.2 MG: 0.2 INJECTION, SOLUTION INTRAMUSCULAR; INTRAVENOUS; SUBCUTANEOUS at 13:26

## 2022-02-28 RX ADMIN — LIDOCAINE HYDROCHLORIDE 30 ML: 20 SOLUTION ORAL; TOPICAL at 12:50

## 2022-02-28 RX ADMIN — LEVOTHYROXINE SODIUM 150 MCG: 0.05 TABLET ORAL at 05:51

## 2022-02-28 RX ADMIN — PANTOPRAZOLE SODIUM 40 MG: 40 INJECTION, POWDER, FOR SOLUTION INTRAVENOUS at 11:35

## 2022-02-28 RX ADMIN — ONDANSETRON 4 MG: 2 INJECTION INTRAMUSCULAR; INTRAVENOUS at 13:26

## 2022-02-28 RX ADMIN — HYDROMORPHONE HYDROCHLORIDE 0.2 MG: 0.2 INJECTION, SOLUTION INTRAMUSCULAR; INTRAVENOUS; SUBCUTANEOUS at 08:11

## 2022-02-28 RX ADMIN — INSULIN ASPART 1 UNITS: 100 INJECTION, SOLUTION INTRAVENOUS; SUBCUTANEOUS at 08:13

## 2022-02-28 ASSESSMENT — ACTIVITIES OF DAILY LIVING (ADL)
ADLS_ACUITY_SCORE: 7

## 2022-02-28 NOTE — PROGRESS NOTES
"[facility] MEDICINE  PROGRESS NOTE     Code Status: Full Code       Identification/Summary:   66 year old female who  has a past medical history of CAD, Chronic pain syndrome, COPD, Hyperlipidemia, Hypertension, Hypothyroidism, overactive bladder, morbid obesity, and Type 2 diabetes mellitus.   2/27 admitted for recurrent partial small bowel obstruction.  Refusing NG tube placement.  2/28 still having abdominal pain.  Agreeable to Gastrografin.     Assessment and Plan  Partial Small Bowel obstruction  Dehydration  Recurrent episodes though none in the past ~2 years.  Refusing NG tube at admission.  Made n.p.o. and given intravenous fluids.  Utilize Prn dilaudid.  2/28 having intermittent episodes of increased abdominal pain.  Discussed with patient again recommendation for NG tube placement.  Patient still refused but is agreeable to Gastrografin.  Ordered p.o. Gastrografin small bowel follow-through challenge.  If ineffective may need general surgery consult.   Lactic acidosis-resolved  Leukocytosis  Initial lactate 2.6.  Normalized after intravenous fluids.  Admission WBC 16.7--> 12.3.  Recheck CBC tomorrow.  Acute hypoxemic respiratory failure  COPD  Morbid obesity hypoventilation syndrome BMI 47  Patient reports that she had \"borderline\" sleep apnea in the past.  Admits she has gained weight since then.  Continue home as needed albuterol and Symbicort.  Monitor pulse oximetry while sleeping and provide supplemental oxygen as needed.  Recommend outpatient sleep study.  Chest pressure  Coronary artery disease  Last Echo 60% (2017), CTA angio 2017 showed minimal plaque  Continued on home aspirin.  Has never used Nitrostat in the past.  Chest pain had improved with antacids at home.  2/28 recurrent complaints of chest pressure though accompanying abdominal discomfort.  EKG unremarkable.  Utilize GI cocktail as needed.  Chronic pain syndrome  Depression  Continue home Cymbalta and " scheduled Norco.  We will plan to quickly come off of IV Dilaudid once SBO has resolved.  Essential hypertension  Order home Hyzaar 100-25 daily with hold parameters.  Hydralazine available for severe elevations.  Hyperlipidemia  Severe fatty liver  Continue home Zocor.  Noninsulin-dependent type 2 diabetes  A1c 7.8  Initial blood glucose 251.  Follow blood sugars 4 times a day.  Holding metformin while NPO.  Utilize mild sliding scale insulin.  Hypothyroidism  TSH mildly low at 0.27.  Due to acute illness would not adjust levothyroxine dosing at this time.  Continue home levothyroxine 150 mcg daily.  Left adrenal adenoma  Noted on imaging  Clinically may be secretory (cortisol, aldosterone)  Defer dexamethasone suppression until pt's SBO is improved    COVID-19 PCR negative from 2/27/2022  Noted.  Standard precautions.  Anticoagulation   Low Risk/Ambulatory with no VTE prophylaxis indicated  Fluids: Normal saline at 100 mL/h  Pain meds: Norco, Tylenol, IV Dilaudid  Therapy: na  Stark:Not present  Current Diet  Orders Placed This Encounter      NPO for Medical/Clinical Reasons Except for: Meds    Supplements  None    Barriers to Discharge: SBO, abdominal pain    Disposition: Likely here 1-2 more days    Interval History/Subjective:  Patient still complaining of abdominal pain and that does coincide with some chest pressure.  When laying in bed she does get hypoxic on room air but has been able to be up and ambulating.  Was passing gas yesterday but nothing today.  Notes that her last SBO was about 2 years ago.  Declining NG tube as they have had difficult placement in the past and it is very uncomfortable for her.  She is agreeable to orally taking Gastrografin.  Notes that she has a history of borderline sleep apnea.  Since that test she admits that she has gained more weight.  Questions answered to verbalized satisfaction.    Physical Exam/Objective:  Temp:  [97.8  F (36.6  C)-98.4  F (36.9  C)] 97.9  F (36.6   C)  Pulse:  [] 89  Resp:  [16-25] 25  BP: (123-191)/(51-86) 123/51  SpO2:  [87 %-95 %] 91 %  Wt Readings from Last 4 Encounters:   02/27/22 132.5 kg (292 lb 3.2 oz)   07/13/21 135.9 kg (299 lb 11.2 oz)   06/22/21 140.6 kg (310 lb)   06/22/21 140.6 kg (310 lb)     Body mass index is 47.16 kg/m .    Constitutional: awake, alert, cooperative, no apparent distress, and appears stated age and morbidly obese  ENT: Normocephalic, without obvious abnormality, atraumatic, external ears without lesions, oral pharynx with moist mucous membranes, tonsils without erythema or exudates, gums normal and good dentition.  Respiratory: No increased work of breathing, good air exchange, clear to auscultation bilaterally, no crackles or wheezing  Cardiovascular: Normal apical impulse, regular rate and rhythm, normal S1 and S2, no S3 or S4, and no murmur noted  GI: Distended.  High-pitched bowel sounds intermittent.  Diffuse mild to moderate tenderness.  Skin: normal skin color, texture, turgor, no redness, warmth, or swelling, and no rashes  Musculoskeletal: There is no redness, warmth, or swelling of the joints.  Motor strength is 5 out of 5 all extremities bilaterally.  Tone is normal. no lower extremity pitting edema present  Neurologic: Cranial nerves II-XII are grossly intact. Sensory:  Sensory intact  Neuropsychiatric: General: normal, calm and normal eye contact Level of consciousness: alert / normal Affect: normal Orientation: oriented to self, place, time and situation Memory and insight: normal, memory for past and recent events intact and thought process normal      Medications:   Personally Reviewed.  Medications     sodium chloride 100 mL/hr at 02/28/22 1000       allopurinol  300 mg Oral Daily     aspirin  81 mg Oral Daily     DULoxetine  30 mg Oral Daily     fluticasone-vilanterol  1 puff Inhalation Daily     HYDROcodone-acetaminophen  1 tablet Oral TID     insulin aspart  1-3 Units Subcutaneous TID AC     insulin  aspart  1-3 Units Subcutaneous At Bedtime     levothyroxine  150 mcg Oral Daily     losartan-hydrochlorothiazide  1 tablet Oral Daily     pantoprazole (PROTONIX) IV  40 mg Intravenous Q24H     simvastatin  20 mg Oral At Bedtime     sodium chloride (PF)  3 mL Intracatheter Q8H       Data reviewed today: I personally reviewed all new medications, labs, imaging/diagnostics reports over the past 24 hours. Pertinent findings include:    EKG  28-FEB-2022 12:15:30 [facility]-[id number] ROUTINE RETRIEVAL Sinus rhythm with sinus arrhythmia Low voltage QRS Nonspecific T wave abnormality Prolonged QT Abnormal ECG When compared with ECG of 27-JUN-2021 09:07, Borderline criteria for Inferior infarct are no longer Present      Imaging:   Recent Results (from the past 24 hour(s))   Abd/pelvis CT,  IV  contrast only TRAUMA / AAA    Narrative    EXAM: CT ABDOMEN PELVIS W CONTRAST  LOCATION: [facility]  DATE/TIME: 2/27/2022 4:43 PM    INDICATION: Right abdominal pain. Recurrent bowel obstructions.  COMPARISON: CT 11/25/2020 and 6/4/2018  TECHNIQUE: CT scan of the abdomen and pelvis was performed following injection of IV contrast. Multiplanar reformats were obtained. Dose reduction techniques were used.  CONTRAST: ISOVUE 370 100 mL    FINDINGS:   LOWER CHEST: Stable right middle lobe subpleural 4 mm nodule is benign by time criteria.    HEPATOBILIARY: Severe diffuse hepatic steatosis.    PANCREAS: Normal.    SPLEEN: Normal.    ADRENAL GLANDS: Stable left adrenal 1.5 cm nodule consistent with benign adenoma.    KIDNEYS/BLADDER: Small bilateral renal cysts. No further imaging recommended.    BOWEL: There are some mildly prominent loops of mid small bowel in the left side abdomen with air-fluid levels, and mild mesenteric stranding. This stranding could represent edema or scar. Distal small bowel appears decompressed. A definite transition   point is not visualized. These findings appear similar to  previous.    Colonic diverticulosis.    LYMPH NODES: Normal.    VASCULATURE: Unremarkable.    PELVIC ORGANS: Absent uterus. No pelvic mass. Small amount free fluid.    MUSCULOSKELETAL: Lower lumbar disc degeneration.      Impression    IMPRESSION:   1.  Mildly prominent mid small bowel could represent focal adynamic ileus, or mild obstruction.  2.  Small amount adjacent mesenteric edema or scar.  3.  Chronic hepatic steatosis.  4.  Left adrenal 1.5 cm adenoma.       Labs:  Abd/pelvis CT,  IV  contrast only TRAUMA / AAA   Final Result   IMPRESSION:    1.  Mildly prominent mid small bowel could represent focal adynamic ileus, or mild obstruction.   2.  Small amount adjacent mesenteric edema or scar.   3.  Chronic hepatic steatosis.   4.  Left adrenal 1.5 cm adenoma.        Recent Results (from the past 24 hour(s))   Basic metabolic panel    Collection Time: 02/27/22  3:51 PM   Result Value Ref Range    Sodium 137 136 - 145 mmol/L    Potassium 3.9 3.5 - 5.0 mmol/L    Chloride 97 (L) 98 - 107 mmol/L    Carbon Dioxide (CO2) 26 22 - 31 mmol/L    Anion Gap 14 5 - 18 mmol/L    Urea Nitrogen 11 8 - 22 mg/dL    Creatinine 0.75 0.60 - 1.10 mg/dL    Calcium 8.9 8.5 - 10.5 mg/dL    Glucose 251 (H) 70 - 125 mg/dL    GFR Estimate 88 >60 mL/min/1.73m2   Hepatic panel    Collection Time: 02/27/22  3:51 PM   Result Value Ref Range    Bilirubin Total 0.5 0.0 - 1.0 mg/dL    Bilirubin Direct 0.1 <=0.5 mg/dL    Protein Total 6.9 6.0 - 8.0 g/dL    Albumin 3.5 3.5 - 5.0 g/dL    Alkaline Phosphatase 84 45 - 120 U/L    AST 25 0 - 40 U/L    ALT 34 0 - 45 U/L   Lipase    Collection Time: 02/27/22  3:51 PM   Result Value Ref Range    Lipase 18 0 - 52 U/L   Lactic acid whole blood    Collection Time: 02/27/22  3:51 PM   Result Value Ref Range    Lactic Acid 2.6 (H) 0.7 - 2.0 mmol/L   CBC with platelets and differential    Collection Time: 02/27/22  3:51 PM   Result Value Ref Range    WBC Count 16.7 (H) 4.0 - 11.0 10e3/uL    RBC Count 4.91 3.80 -  5.20 10e6/uL    Hemoglobin 14.1 11.7 - 15.7 g/dL    Hematocrit 43.7 35.0 - 47.0 %    MCV 89 78 - 100 fL    MCH 28.7 26.5 - 33.0 pg    MCHC 32.3 31.5 - 36.5 g/dL    RDW 13.3 10.0 - 15.0 %    Platelet Count 371 150 - 450 10e3/uL    % Neutrophils 86 %    % Lymphocytes 6 %    % Monocytes 6 %    % Eosinophils 1 %    % Basophils 0 %    % Immature Granulocytes 1 %    NRBCs per 100 WBC 0 <1 /100    Absolute Neutrophils 14.4 (H) 1.6 - 8.3 10e3/uL    Absolute Lymphocytes 1.0 0.8 - 5.3 10e3/uL    Absolute Monocytes 1.0 0.0 - 1.3 10e3/uL    Absolute Eosinophils 0.2 0.0 - 0.7 10e3/uL    Absolute Basophils 0.0 0.0 - 0.2 10e3/uL    Absolute Immature Granulocytes 0.1 <=0.4 10e3/uL    Absolute NRBCs 0.0 10e3/uL   TSH    Collection Time: 02/27/22  3:51 PM   Result Value Ref Range    TSH 0.27 (L) 0.30 - 5.00 uIU/mL   Hemoglobin A1c    Collection Time: 02/27/22  3:51 PM   Result Value Ref Range    Hemoglobin A1C 7.8 (H) <=5.6 %   Asymptomatic COVID-19 Virus (Coronavirus) by PCR Nasopharyngeal    Collection Time: 02/27/22  3:55 PM    Specimen: Nasopharyngeal; Swab   Result Value Ref Range    SARS CoV2 PCR Negative Negative   Glucose by meter    Collection Time: 02/27/22  6:39 PM   Result Value Ref Range    GLUCOSE BY METER POCT 202 (H) 70 - 99 mg/dL   Glucose by meter    Collection Time: 02/27/22  9:55 PM   Result Value Ref Range    GLUCOSE BY METER POCT 140 (H) 70 - 99 mg/dL   Glucose by meter    Collection Time: 02/28/22  2:08 AM   Result Value Ref Range    GLUCOSE BY METER POCT 138 (H) 70 - 99 mg/dL   Glucose by meter    Collection Time: 02/28/22  7:47 AM   Result Value Ref Range    GLUCOSE BY METER POCT 150 (H) 70 - 99 mg/dL   Lactic acid whole blood    Collection Time: 02/28/22  8:22 AM   Result Value Ref Range    Lactic Acid 2.0 0.7 - 2.0 mmol/L   CBC with platelets    Collection Time: 02/28/22  8:22 AM   Result Value Ref Range    WBC Count 12.3 (H) 4.0 - 11.0 10e3/uL    RBC Count 4.64 3.80 - 5.20 10e6/uL    Hemoglobin 13.4 11.7 -  15.7 g/dL    Hematocrit 43.0 35.0 - 47.0 %    MCV 93 78 - 100 fL    MCH 28.9 26.5 - 33.0 pg    MCHC 31.2 (L) 31.5 - 36.5 g/dL    RDW 13.6 10.0 - 15.0 %    Platelet Count 372 150 - 450 10e3/uL   Glucose by meter    Collection Time: 02/28/22 11:00 AM   Result Value Ref Range    GLUCOSE BY METER POCT 127 (H) 70 - 99 mg/dL       Pending Labs:  Unresulted Labs Ordered in the Past 30 Days of this Admission     No orders found for last 31 day(s).          Gustabo Calvin MD  [facility]  Phone: #[phone]

## 2022-02-28 NOTE — PROGRESS NOTES
"CLINICAL NUTRITION SERVICES - ASSESSMENT NOTE     Nutrition Prescription    RECOMMENDATIONS FOR MDs/PROVIDERS TO ORDER:  Recommend a DM education consult secondary to elevated A1c     Malnutrition Status:    She does not meet 2 criteria     Recommendations already ordered by Registered Dietitian (RD):  None at this time, will monitor diet adv     Future/Additional Recommendations:  Pt asked to check again another day about diet education      REASON FOR ASSESSMENT  Sharmila Shore is a/an 66 year old female assessed by the dietitian for Admission Nutrition Risk Screen for wt loss, reduced po     Pt admitted with SBO, COPD, chronic, pain, DM2    NUTRITION HISTORY  NKFA  She states she doesn't follow a DM diet at home. She is going to start working with her insurance monthly to improve her blood sugar control. She states her appetite was good prior to this admission. She hasn't been drinking supplements at home. She states she does check her blood sugars at home regularly and they have been high    CURRENT NUTRITION ORDERS  Diet: NPO       LABS  Labs reviewed; Gluc-150, 127; Hgb A1c-7.8    MEDICATIONS  Medications reviewed, Novolog    ANTHROPOMETRICS  Height: 167.6 cm (5' 6\")  Most Recent Weight: 132.5 kg (292 lb 3.2 oz)    IBW: 59 kg  BMI: Obesity Grade III BMI >40  Weight History:   Wt Readings from Last 10 Encounters:   02/27/22 132.5 kg (292 lb 3.2 oz)   07/13/21 135.9 kg (299 lb 11.2 oz)   06/22/21 140.6 kg (310 lb)   06/22/21 140.6 kg (310 lb)   06/21/21 136.7 kg (301 lb 4.8 oz)   06/18/21 140.6 kg (310 lb)   05/19/21 140.6 kg (310 lb)   05/19/21 140.6 kg (310 lb)   05/19/16 128.4 kg (283 lb 1.6 oz)   Pt is down 18# in the past 9 months (6%)     Dosing Weight: 77 kg    ASSESSED NUTRITION NEEDS  Estimated Energy Needs: 8089-2399 kcals/day (20 - 25 kcals/kg)  Justification: Maintenance  Estimated Protein Needs: 77-92 grams protein/day (1 - 1.2 grams of pro/kg)  Justification: Maintenance  Estimated Fluid Needs: " 3754-1100 mL/day (1 mL/kcal)   Justification: Maintenance    PHYSICAL FINDINGS  See malnutrition section below.  Per flowsheet:  Edema-none noted   GI-distention, nausea, faint/hypoactive bs   Skin-no skin breakdown noted       MALNUTRITION:  % Weight Loss:  Weight loss does not meet criteria for malnutrition   % Intake:  No decreased intake noted  Subcutaneous Fat Loss:  None observed  Muscle Loss:  None observed  Fluid Retention:  None noted    Malnutrition Diagnosis: Patient does not meet two of the above criteria necessary for diagnosing malnutrition    NUTRITION DIAGNOSIS  Inadequate protein-energy intake related to SBO as evidenced by bowel rest     Altered nutrition related labs r/t  DM AEB Hgb A1c of 7.8      INTERVENTIONS  Implementation  None at this time  She said she wasn't sure if she wanted diet ed at this time and to try another day     Goals  Diet advancement vs nutrition support within 2-3 days.  Glycemic control wnl      Monitoring/Evaluation  Progress toward goals will be monitored and evaluated per protocol.

## 2022-02-28 NOTE — PLAN OF CARE
Problem: Plan of Care - These are the overarching goals to be used throughout the patient stay.    Goal: Absence of Hospital-Acquired Illness or Injury  Outcome: Ongoing, Progressing  Intervention: Identify and Manage Fall Risk  Recent Flowsheet Documentation  Taken 2/28/2022 0826 by Elena Alfredo, RN  Safety Promotion/Fall Prevention:   activity supervised   clutter free environment maintained   mobility aid in reach   nonskid shoes/slippers when out of bed   patient and family education   safety round/check completed     Goal Outcome Evaluation: Patient's IV fluids remain at 100 ml/hour through PIV. PRN zofran, compazine, and dilaudid given for abdominal pain/nausea. Patient did c/o some abdominal pain/chest discomfort around 10:45 AM. Vitals were taken, blood sugar check, and MD notified. Protonix IV given and one time PRN GI cocktail given to patient. Patient still refusing NGT at this time. Per discussion with MD, pt would like to try small bowel follow through. Gastrografin ordered. Standby assist with cane and IV pole. NPO. Will continue to monitor.

## 2022-02-28 NOTE — PROGRESS NOTES
"Patient was notified of the gastrografin and it was brought into patient room. Patient states \"I'm not ready to take it yet\". PRN zofran and dilaudid given for pain/nausea control. Nasal cannula placed on patient in case patient falls asleep and oxygen levels drop again. Will re-approach in a couple hours about the gastrografin.   "

## 2022-02-28 NOTE — PLAN OF CARE
"  Problem: Plan of Care - These are the overarching goals to be used throughout the patient stay.    Goal: Optimal Comfort and Wellbeing  Outcome: Ongoing, Progressing  Intervention: Monitor Pain and Promote Comfort  Recent Flowsheet Documentation  Taken 2/28/2022 0113 by Mara Fairchild, RN  Pain Management Interventions:    medication (see MAR)    aromatherapy    care clustered    emotional support    essential oils    relaxation techniques promoted  Taken 2/27/2022 2246 by Mara Fairchild, RN  Pain Management Interventions:    medication (see MAR)    aromatherapy   Goal Outcome Evaluation:        Patient was admitted on day shift from ED to Room 2106 with a SBO. Patient is A & O X4 and able to make needs known.  AIDET completed upon initial introduction and writer reviewed POC, orders and unit routine. Patient refused all oral evening meds, stating she doesn't want to \"throw up.\" Dilaudid 0.2mg given X 3 for reports of difuse abdominal pain rated 8-9/10 and decreasing to 6/10 upon reassessment. Zofran given X 1 for nausea. No emesis noted. Discussed NG for decompression and to address pain, patient states she will not have an NG. Abdomen is obese, round, soft, slightly distended and tender X 4 quads. Patient denies flatus or BM. Patient reports abdomen has gone down in comparison when she came into ED. Will continue with NPO except meds and bowel rest.              "

## 2022-02-28 NOTE — CONSULTS
Care Management Initial Consult    General Information  Assessment completed with: Patient,    Type of CM/SW Visit: Initial Assessment    Primary Care Provider verified and updated as needed: Yes   Readmission within the last 30 days: no previous admission in last 30 days      Reason for Consult: discharge planning  Advance Care Planning: Advance Care Planning Reviewed: questions answered, other (see comments) (Given Honoring Choices information)     General Information Comments: Lives downstairs apartment at daughter''s house    Communication Assessment  Patient's communication style: spoken language (English or Bilingual)             Cognitive  Cognitive/Neuro/Behavioral:                        Living Environment:   People in home: child(keanu), adult     Current living Arrangements: house      Able to return to prior arrangements: yes  Living Arrangement Comments: Lives with daughter    Family/Social Support:  Care provided by: self, child(keanu)  Provides care for: no one, unable/limited ability to care for self  Marital Status: Single  Children          Description of Support System: Supportive, Involved    Support Assessment: Adequate family and caregiver support    Current Resources:   Patient receiving home care services: No     Community Resources: None  Equipment currently used at home: cane, quad  Supplies currently used at home: None    Employment/Financial:  Employment Status: disabled        Financial Concerns: No concerns identified   Referral to Financial Worker: No       Lifestyle & Psychosocial Needs:  Social Determinants of Health     Tobacco Use: Medium Risk     Smoking Tobacco Use: Former Smoker     Smokeless Tobacco Use: Never Used   Alcohol Use: Not on file   Financial Resource Strain: Not on file   Food Insecurity: Not on file   Transportation Needs: Not on file   Physical Activity: Not on file   Stress: Not on file   Social Connections: Not on file   Intimate Partner Violence: Not on file    Depression: Not on file   Housing Stability: Not on file       Functional Status:  Prior to admission patient needed assistance:   Dependent ADLs:: Ambulation-cane, Bathing  Dependent IADLs:: Cleaning, Shopping, Medication Management, Transportation  Assesssment of Functional Status: Not at  functional baseline, Needs assistance with laundry/houskeeping, Needs assistance with bathing, Needs assistance with medications, Needs assistance with shopping, Needs assistance with transportation    Mental Health Status:          Chemical Dependency Status:                Values/Beliefs:  Spiritual, Cultural Beliefs, Confucianism Practices, Values that affect care:                 Additional Information:  Alert, oriented patient lives in the downstairs apartment of her daughter Laura s house. Patient is disabled; uses a four-prong cane; no home care services; hasn t driven in a couple of years. States she is assisted with I/ADLs by her daughter including medication set-up, help with bathing, house cleaning, and transportation. Given Honoring Choices information. Daughter Laura will transport patient on discharge. Other needs pending clinical progress.      MARCO SALINAS, RN/CM

## 2022-03-01 ENCOUNTER — APPOINTMENT (OUTPATIENT)
Dept: RADIOLOGY | Facility: CLINIC | Age: 67
DRG: 388 | End: 2022-03-01
Attending: FAMILY MEDICINE
Payer: COMMERCIAL

## 2022-03-01 ENCOUNTER — APPOINTMENT (OUTPATIENT)
Dept: CARDIOLOGY | Facility: CLINIC | Age: 67
DRG: 388 | End: 2022-03-01
Attending: FAMILY MEDICINE
Payer: COMMERCIAL

## 2022-03-01 LAB
ATRIAL RATE - MUSE: 90 BPM
DIASTOLIC BLOOD PRESSURE - MUSE: NORMAL MMHG
ERYTHROCYTE [DISTWIDTH] IN BLOOD BY AUTOMATED COUNT: 13.9 % (ref 10–15)
GLUCOSE BLDC GLUCOMTR-MCNC: 103 MG/DL (ref 70–99)
GLUCOSE BLDC GLUCOMTR-MCNC: 109 MG/DL (ref 70–99)
GLUCOSE BLDC GLUCOMTR-MCNC: 128 MG/DL (ref 70–99)
GLUCOSE BLDC GLUCOMTR-MCNC: 131 MG/DL (ref 70–99)
GLUCOSE BLDC GLUCOMTR-MCNC: 147 MG/DL (ref 70–99)
HCT VFR BLD AUTO: 40.2 % (ref 35–47)
HGB BLD-MCNC: 12.1 G/DL (ref 11.7–15.7)
HOLD SPECIMEN: NORMAL
INTERPRETATION ECG - MUSE: NORMAL
LVEF ECHO: NORMAL
LVEF ECHO: NORMAL
MCH RBC QN AUTO: 28.3 PG (ref 26.5–33)
MCHC RBC AUTO-ENTMCNC: 30.1 G/DL (ref 31.5–36.5)
MCV RBC AUTO: 94 FL (ref 78–100)
P AXIS - MUSE: 69 DEGREES
PLATELET # BLD AUTO: 332 10E3/UL (ref 150–450)
PR INTERVAL - MUSE: 176 MS
QRS DURATION - MUSE: 104 MS
QT - MUSE: 386 MS
QTC - MUSE: 472 MS
R AXIS - MUSE: 74 DEGREES
RBC # BLD AUTO: 4.27 10E6/UL (ref 3.8–5.2)
SYSTOLIC BLOOD PRESSURE - MUSE: NORMAL MMHG
T AXIS - MUSE: 65 DEGREES
VENTRICULAR RATE- MUSE: 90 BPM
WBC # BLD AUTO: 8.4 10E3/UL (ref 4–11)

## 2022-03-01 PROCEDURE — 250N000013 HC RX MED GY IP 250 OP 250 PS 637: Performed by: HOSPITALIST

## 2022-03-01 PROCEDURE — 250N000013 HC RX MED GY IP 250 OP 250 PS 637: Performed by: FAMILY MEDICINE

## 2022-03-01 PROCEDURE — 250N000011 HC RX IP 250 OP 636: Performed by: FAMILY MEDICINE

## 2022-03-01 PROCEDURE — 120N000001 HC R&B MED SURG/OB

## 2022-03-01 PROCEDURE — C9113 INJ PANTOPRAZOLE SODIUM, VIA: HCPCS | Performed by: FAMILY MEDICINE

## 2022-03-01 PROCEDURE — 250N000011 HC RX IP 250 OP 636: Performed by: HOSPITALIST

## 2022-03-01 PROCEDURE — 85014 HEMATOCRIT: CPT | Performed by: FAMILY MEDICINE

## 2022-03-01 PROCEDURE — 36415 COLL VENOUS BLD VENIPUNCTURE: CPT | Performed by: FAMILY MEDICINE

## 2022-03-01 PROCEDURE — 93306 TTE W/DOPPLER COMPLETE: CPT | Mod: 26 | Performed by: INTERNAL MEDICINE

## 2022-03-01 PROCEDURE — 74018 RADEX ABDOMEN 1 VIEW: CPT

## 2022-03-01 PROCEDURE — 258N000003 HC RX IP 258 OP 636: Performed by: FAMILY MEDICINE

## 2022-03-01 PROCEDURE — 999N000208 ECHOCARDIOGRAM COMPLETE

## 2022-03-01 PROCEDURE — 255N000002 HC RX 255 OP 636: Performed by: FAMILY MEDICINE

## 2022-03-01 PROCEDURE — 99232 SBSQ HOSP IP/OBS MODERATE 35: CPT | Performed by: FAMILY MEDICINE

## 2022-03-01 RX ADMIN — PANTOPRAZOLE SODIUM 40 MG: 40 INJECTION, POWDER, FOR SOLUTION INTRAVENOUS at 11:54

## 2022-03-01 RX ADMIN — PROCHLORPERAZINE EDISYLATE 10 MG: 5 INJECTION INTRAMUSCULAR; INTRAVENOUS at 05:27

## 2022-03-01 RX ADMIN — ACETAMINOPHEN 975 MG: 325 TABLET ORAL at 09:23

## 2022-03-01 RX ADMIN — HYDROCODONE BITARTRATE AND ACETAMINOPHEN 1 TABLET: 7.5; 325 TABLET ORAL at 20:36

## 2022-03-01 RX ADMIN — DIATRIZOATE MEGLUMINE AND DIATRIZOATE SODIUM 75 ML: 660; 100 SOLUTION ORAL; RECTAL at 08:00

## 2022-03-01 RX ADMIN — HYDROMORPHONE HYDROCHLORIDE 0.2 MG: 0.2 INJECTION, SOLUTION INTRAMUSCULAR; INTRAVENOUS; SUBCUTANEOUS at 07:54

## 2022-03-01 RX ADMIN — LEVOTHYROXINE SODIUM 150 MCG: 0.05 TABLET ORAL at 05:23

## 2022-03-01 RX ADMIN — ASPIRIN 81 MG: 81 TABLET, COATED ORAL at 07:57

## 2022-03-01 RX ADMIN — SIMVASTATIN 20 MG: 20 TABLET, FILM COATED ORAL at 20:36

## 2022-03-01 RX ADMIN — HYDROMORPHONE HYDROCHLORIDE 0.2 MG: 0.2 INJECTION, SOLUTION INTRAMUSCULAR; INTRAVENOUS; SUBCUTANEOUS at 23:29

## 2022-03-01 RX ADMIN — PERFLUTREN 4 ML: 6.52 INJECTION, SUSPENSION INTRAVENOUS at 15:05

## 2022-03-01 RX ADMIN — HYDROMORPHONE HYDROCHLORIDE 0.2 MG: 0.2 INJECTION, SOLUTION INTRAMUSCULAR; INTRAVENOUS; SUBCUTANEOUS at 02:20

## 2022-03-01 RX ADMIN — HYDROMORPHONE HYDROCHLORIDE 0.2 MG: 0.2 INJECTION, SOLUTION INTRAMUSCULAR; INTRAVENOUS; SUBCUTANEOUS at 05:23

## 2022-03-01 RX ADMIN — DULOXETINE 30 MG: 30 CAPSULE, DELAYED RELEASE ORAL at 07:57

## 2022-03-01 RX ADMIN — HYDROCODONE BITARTRATE AND ACETAMINOPHEN 1 TABLET: 7.5; 325 TABLET ORAL at 13:45

## 2022-03-01 RX ADMIN — LOSARTAN POTASSIUM AND HYDROCHLOROTHIAZIDE 1 TABLET: 100; 25 TABLET, FILM COATED ORAL at 07:57

## 2022-03-01 RX ADMIN — SODIUM CHLORIDE: 9 INJECTION, SOLUTION INTRAVENOUS at 14:47

## 2022-03-01 RX ADMIN — FLUTICASONE FUROATE AND VILANTEROL TRIFENATATE 1 PUFF: 200; 25 POWDER RESPIRATORY (INHALATION) at 07:58

## 2022-03-01 ASSESSMENT — ACTIVITIES OF DAILY LIVING (ADL)
ADLS_ACUITY_SCORE: 7

## 2022-03-01 NOTE — PLAN OF CARE
"  Problem: Gas Exchange Impaired  Goal: Optimal Gas Exchange  Outcome: Ongoing, Progressing         When RN took vitals this morning, patient's oxygen saturation at 73%, as she had taken off her nasal cannula during the night. Patient was asymptomatic, resting calmly in bed. Patient did feel \"crumby\" but she did not attribute it to her oxygen levels. RN replaced nasal cannula, reinforced importance of compliance. Later when up to bathroom, patient had taken off oxygen and not replaced again, asymptomatic but saturation 63%. RN provided oxygen tube long enough to reach entire room, again stressed safety concerns regarding hypoxia. Patient will not keep a pulse oximeter on despite RN requests. MD aware. Will continue to monitor.                "

## 2022-03-01 NOTE — PLAN OF CARE
Problem: Pain Acute  Goal: Acceptable Pain Control and Functional Ability  3/1/2022 1026 by Celina Bull, SEJAL  Outcome: Ongoing, Progressing  3/1/2022 0818 by Celina Bull, SEJAL  Outcome: Ongoing, Progressing    Patient feels crumby today, can't quite put a finger on it. Initially she thought she was febrile, temp 97.7. She also has persistent RLQ pain, managed with IV dilaudid. Reinforced opioid management with SBO, as patient didn't understand that dilaudid is a narcotic. RN encouraging activity, up ambulating frequently for short distances. Patient agreed to take gastrografin this AM, now stooling frequently. XR notified, will go for SBFT between 2455-5280.

## 2022-03-01 NOTE — PROGRESS NOTES
"[facility] MEDICINE  PROGRESS NOTE     Code Status: Full Code       Identification/Summary:   66 year old female who  has a past medical history of CAD, Chronic pain syndrome, COPD, Hyperlipidemia, Hypertension, Hypothyroidism, overactive bladder, morbid obesity, and Type 2 diabetes mellitus.   2/27 admitted for recurrent partial small bowel obstruction.  Refusing NG tube placement.  2/28 still having abdominal pain.  Agreeable to Gastrografin but declined to take until 3/1.  Now having BMs.  Awaiting follow-up x-ray.     Assessment and Plan  Partial Small Bowel obstruction  Dehydration  Recurrent episodes though none in the past ~2 years.  Refusing NG tube at admission.  Made n.p.o. and given intravenous fluids.  Utilize Prn dilaudid.  2/28 having intermittent episodes of increased abdominal pain.  Patient declined NG tube placement but agreed to Gastrografin.  However wanted to wait until the following morning.  3/1 took p.o. Gastrografin.  Having now frequent bowel movements.  Awaiting SBFT x-ray.  Once contrast confirmed would plan to start clear liquids and ADAT.  Lactic acidosis-resolved  Leukocytosis  Initial lactate 2.6.  Normalized after intravenous fluids.  Admission WBC 16.7--> 12.3--> 8.4.  Acute hypoxemic respiratory failure  COPD  Morbid obesity hypoventilation syndrome BMI 47  Patient reports that she had \"borderline\" sleep apnea in the past.  Admits she has gained weight since then.  Continue home as needed albuterol and Symbicort.  Requiring supplemental oxygen while resting.  Having some shortness of breath with activity.  Recommend outpatient sleep study.  Chest pressure  Coronary artery disease  Last Echo 60% (2017), CTA angio 2017 showed minimal plaque  Continued on home aspirin.  Has never used Nitrostat in the past.  Chest pain had improved with antacids at home.  2/28 recurrent complaints of chest pressure though accompanying abdominal discomfort.  EKG " unremarkable.  Utilize GI cocktail as needed.  3/1 check echocardiogram.  Chronic pain syndrome  Depression  Continue home Cymbalta and scheduled Norco.  We will plan to quickly come off of IV Dilaudid once SBO has resolved.  Essential hypertension  Continue home Hyzaar 100-25 daily with hold parameters.  Hydralazine available for severe elevations.  Hyperlipidemia  Severe fatty liver  Continue home Zocor.  Noninsulin-dependent type 2 diabetes  A1c 7.8  Initial blood glucose 251.  Follow blood sugars 4 times a day.  Holding metformin while NPO.  Utilize mild sliding scale insulin.  Hypothyroidism  TSH mildly low at 0.27.  Due to acute illness would not adjust levothyroxine dosing at this time.  Continue home levothyroxine 150 mcg daily.  Left adrenal adenoma  Noted on imaging  Clinically may be secretory (cortisol, aldosterone)  Defer dexamethasone suppression until pt's SBO is improved     COVID-19 PCR negative from 2/27/2022  Noted.  Standard precautions.  Anticoagulation   Low Risk/Ambulatory with no VTE prophylaxis indicated  Fluids: Normal saline at 100 mL/h  Pain meds: Norco, Tylenol, IV Dilaudid  Therapy: na   Stark:Not present  Current Diet  Orders Placed This Encounter      NPO for Medical/Clinical Reasons Except for: Meds    Supplements  None    Barriers to Discharge: Gastrografin x-ray, hypoxia    Disposition: Anticipate discharge 3/2    Interval History/Subjective:  After taking Gastrografin this morning patient has been having bowel movements and abdomen feeling better.  Still sore but not as tender.  Patient has been requiring supplemental oxygen 2 to 3 L with rest.  Agreeable to outpatient sleep study.  Questions answered to verbalized satisfaction.    Physical Exam/Objective:  Temp:  [97.7  F (36.5  C)-100.1  F (37.8  C)] 97.9  F (36.6  C)  Pulse:  [] 79  Resp:  [17-25] 17  BP: (112-129)/(55-70) 112/59  SpO2:  [70 %-96 %] 96 %  Wt Readings from Last 4 Encounters:   02/27/22 132.5 kg (292 lb 3.2  oz)   07/13/21 135.9 kg (299 lb 11.2 oz)   06/22/21 140.6 kg (310 lb)   06/22/21 140.6 kg (310 lb)     Body mass index is 47.16 kg/m .    Constitutional: awake, alert, cooperative, no apparent distress, and appears stated age and morbidly obese  ENT: Normocephalic, without obvious abnormality, atraumatic, external ears without lesions, oral pharynx with moist mucous membranes, tonsils without erythema or exudates, gums normal and good dentition.  Respiratory: No increased work of breathing, good air exchange, clear to auscultation bilaterally, no crackles or wheezing  Cardiovascular: Normal apical impulse, regular rate and rhythm, normal S1 and S2, no S3 or S4, and no murmur noted  GI: Diminished bowel sounds soft.  Mild tenderness.  Clinical exam improved from prior visit.  Skin: normal skin color, texture, turgor, no redness, warmth, or swelling, and no rashes  Musculoskeletal: There is no redness, warmth, or swelling of the joints.  Motor strength is 5 out of 5 all extremities bilaterally.  Tone is normal. no lower extremity pitting edema present  Neurologic: Cranial nerves II-XII are grossly intact. Sensory:  Sensory intact  Neuropsychiatric: General: normal, calm and normal eye contact Level of consciousness: alert / normal Affect: normal Orientation: oriented to self, place, time and situation Memory and insight: normal, memory for past and recent events intact and thought process normal      Medications:   Personally Reviewed.  Medications     sodium chloride 100 mL/hr at 03/01/22 0756       allopurinol  300 mg Oral Daily     aspirin  81 mg Oral Daily     DULoxetine  30 mg Oral Daily     fluticasone-vilanterol  1 puff Inhalation Daily     HYDROcodone-acetaminophen  1 tablet Oral TID     insulin aspart  1-3 Units Subcutaneous TID AC     insulin aspart  1-3 Units Subcutaneous At Bedtime     levothyroxine  150 mcg Oral Daily     losartan-hydrochlorothiazide  1 tablet Oral Daily     pantoprazole (PROTONIX) IV  40  mg Intravenous Q24H     simvastatin  20 mg Oral At Bedtime     sodium chloride (PF)  3 mL Intracatheter Q8H       Data reviewed today: I personally reviewed all new medications, labs, imaging/diagnostics reports over the past 24 hours. Pertinent findings include:    Imaging:   No results found for this or any previous visit (from the past 24 hour(s)).    Labs:  Abd/pelvis CT,  IV  contrast only TRAUMA / AAA   Final Result   IMPRESSION:    1.  Mildly prominent mid small bowel could represent focal adynamic ileus, or mild obstruction.   2.  Small amount adjacent mesenteric edema or scar.   3.  Chronic hepatic steatosis.   4.  Left adrenal 1.5 cm adenoma.      XR Gastrografin Challenge    (Results Pending)     Recent Results (from the past 24 hour(s))   Glucose by meter    Collection Time: 02/28/22  4:47 PM   Result Value Ref Range    GLUCOSE BY METER POCT 116 (H) 70 - 99 mg/dL   Glucose by meter    Collection Time: 02/28/22 10:01 PM   Result Value Ref Range    GLUCOSE BY METER POCT 142 (H) 70 - 99 mg/dL   Glucose by meter    Collection Time: 03/01/22  2:20 AM   Result Value Ref Range    GLUCOSE BY METER POCT 109 (H) 70 - 99 mg/dL   CBC with platelets    Collection Time: 03/01/22  4:58 AM   Result Value Ref Range    WBC Count 8.4 4.0 - 11.0 10e3/uL    RBC Count 4.27 3.80 - 5.20 10e6/uL    Hemoglobin 12.1 11.7 - 15.7 g/dL    Hematocrit 40.2 35.0 - 47.0 %    MCV 94 78 - 100 fL    MCH 28.3 26.5 - 33.0 pg    MCHC 30.1 (L) 31.5 - 36.5 g/dL    RDW 13.9 10.0 - 15.0 %    Platelet Count 332 150 - 450 10e3/uL   Extra Red Top Tube    Collection Time: 03/01/22  4:58 AM   Result Value Ref Range    Hold Specimen JIC    Glucose by meter    Collection Time: 03/01/22  6:29 AM   Result Value Ref Range    GLUCOSE BY METER POCT 131 (H) 70 - 99 mg/dL   Glucose by meter    Collection Time: 03/01/22 10:23 AM   Result Value Ref Range    GLUCOSE BY METER POCT 128 (H) 70 - 99 mg/dL       Pending Labs:  Unresulted Labs Ordered in the Past 30  Days of this Admission     No orders found from 1/28/2022 to 2/28/2022.          Gustabo Calvin MD  [facility]  Phone: #[phone]

## 2022-03-01 NOTE — PLAN OF CARE
Problem: Pain Acute  Goal: Acceptable Pain Control and Functional Ability  Intervention: Prevent or Manage Pain  Recent Flowsheet Documentation  Taken 3/1/2022 0220 by Chay Knott RN  Medication Review/Management: medications reviewed     Goal Outcome Evaluation:      Pt continues to have abdominal pain, rating at 8-9/10. Also had a HA and chest pain at beginning of shift, Prn IV dilaudid and tylenol given, offered norco but declined, IV fluids infusing NPO except for meds. +flatus, refused the gastrografin yesterday and last night, agreed to take it this am, VSS. Will continue to monitor.

## 2022-03-02 ENCOUNTER — APPOINTMENT (OUTPATIENT)
Dept: CT IMAGING | Facility: CLINIC | Age: 67
DRG: 388 | End: 2022-03-02
Attending: FAMILY MEDICINE
Payer: COMMERCIAL

## 2022-03-02 LAB
GLUCOSE BLDC GLUCOMTR-MCNC: 132 MG/DL (ref 70–99)
GLUCOSE BLDC GLUCOMTR-MCNC: 145 MG/DL (ref 70–99)
GLUCOSE BLDC GLUCOMTR-MCNC: 151 MG/DL (ref 70–99)
GLUCOSE BLDC GLUCOMTR-MCNC: 156 MG/DL (ref 70–99)
GLUCOSE BLDC GLUCOMTR-MCNC: 164 MG/DL (ref 70–99)

## 2022-03-02 PROCEDURE — C9113 INJ PANTOPRAZOLE SODIUM, VIA: HCPCS | Performed by: FAMILY MEDICINE

## 2022-03-02 PROCEDURE — 250N000013 HC RX MED GY IP 250 OP 250 PS 637: Performed by: HOSPITALIST

## 2022-03-02 PROCEDURE — 70491 CT SOFT TISSUE NECK W/DYE: CPT

## 2022-03-02 PROCEDURE — 250N000011 HC RX IP 250 OP 636: Performed by: FAMILY MEDICINE

## 2022-03-02 PROCEDURE — 120N000001 HC R&B MED SURG/OB

## 2022-03-02 PROCEDURE — 250N000011 HC RX IP 250 OP 636: Performed by: HOSPITALIST

## 2022-03-02 PROCEDURE — 71275 CT ANGIOGRAPHY CHEST: CPT

## 2022-03-02 PROCEDURE — 250N000013 HC RX MED GY IP 250 OP 250 PS 637: Performed by: FAMILY MEDICINE

## 2022-03-02 PROCEDURE — 99232 SBSQ HOSP IP/OBS MODERATE 35: CPT | Performed by: FAMILY MEDICINE

## 2022-03-02 RX ORDER — IOPAMIDOL 755 MG/ML
100 INJECTION, SOLUTION INTRAVASCULAR ONCE
Status: COMPLETED | OUTPATIENT
Start: 2022-03-02 | End: 2022-03-02

## 2022-03-02 RX ADMIN — LEVOTHYROXINE SODIUM 150 MCG: 0.05 TABLET ORAL at 04:57

## 2022-03-02 RX ADMIN — FLUTICASONE FUROATE AND VILANTEROL TRIFENATATE 1 PUFF: 200; 25 POWDER RESPIRATORY (INHALATION) at 08:40

## 2022-03-02 RX ADMIN — DULOXETINE 30 MG: 30 CAPSULE, DELAYED RELEASE ORAL at 08:42

## 2022-03-02 RX ADMIN — ACETAMINOPHEN 975 MG: 325 TABLET ORAL at 08:41

## 2022-03-02 RX ADMIN — INSULIN ASPART 1 UNITS: 100 INJECTION, SOLUTION INTRAVENOUS; SUBCUTANEOUS at 17:06

## 2022-03-02 RX ADMIN — ASPIRIN 81 MG: 81 TABLET, COATED ORAL at 08:41

## 2022-03-02 RX ADMIN — LOSARTAN POTASSIUM AND HYDROCHLOROTHIAZIDE 1 TABLET: 100; 25 TABLET, FILM COATED ORAL at 08:42

## 2022-03-02 RX ADMIN — SIMVASTATIN 20 MG: 20 TABLET, FILM COATED ORAL at 20:50

## 2022-03-02 RX ADMIN — HYDROCODONE BITARTRATE AND ACETAMINOPHEN 1 TABLET: 7.5; 325 TABLET ORAL at 08:41

## 2022-03-02 RX ADMIN — IOPAMIDOL 100 ML: 755 INJECTION, SOLUTION INTRAVENOUS at 16:15

## 2022-03-02 RX ADMIN — HYDROMORPHONE HYDROCHLORIDE 0.2 MG: 0.2 INJECTION, SOLUTION INTRAMUSCULAR; INTRAVENOUS; SUBCUTANEOUS at 04:57

## 2022-03-02 RX ADMIN — INSULIN ASPART 1 UNITS: 100 INJECTION, SOLUTION INTRAVENOUS; SUBCUTANEOUS at 11:14

## 2022-03-02 RX ADMIN — ALLOPURINOL 300 MG: 300 TABLET ORAL at 08:42

## 2022-03-02 RX ADMIN — HYDROCODONE BITARTRATE AND ACETAMINOPHEN 1 TABLET: 7.5; 325 TABLET ORAL at 14:19

## 2022-03-02 RX ADMIN — HYDROCODONE BITARTRATE AND ACETAMINOPHEN 1 TABLET: 7.5; 325 TABLET ORAL at 20:50

## 2022-03-02 RX ADMIN — PANTOPRAZOLE SODIUM 40 MG: 40 INJECTION, POWDER, FOR SOLUTION INTRAVENOUS at 14:19

## 2022-03-02 ASSESSMENT — ACTIVITIES OF DAILY LIVING (ADL)
ADLS_ACUITY_SCORE: 9
ADLS_ACUITY_SCORE: 7
ADLS_ACUITY_SCORE: 9
ADLS_ACUITY_SCORE: 7
ADLS_ACUITY_SCORE: 7
ADLS_ACUITY_SCORE: 9
ADLS_ACUITY_SCORE: 7
ADLS_ACUITY_SCORE: 9
ADLS_ACUITY_SCORE: 7
ADLS_ACUITY_SCORE: 7
ADLS_ACUITY_SCORE: 9
ADLS_ACUITY_SCORE: 9
ADLS_ACUITY_SCORE: 7
ADLS_ACUITY_SCORE: 9
ADLS_ACUITY_SCORE: 9
ADLS_ACUITY_SCORE: 7
ADLS_ACUITY_SCORE: 7
ADLS_ACUITY_SCORE: 9
ADLS_ACUITY_SCORE: 7

## 2022-03-02 NOTE — PROGRESS NOTES
"[facility] MEDICINE  PROGRESS NOTE     Code Status: Full Code       Identification/Summary:   66 year old female who  has a past medical history of CAD, Chronic pain syndrome, COPD, Hyperlipidemia, Hypertension, Hypothyroidism, overactive bladder, morbid obesity, and Type 2 diabetes mellitus.   2/27 admitted for recurrent partial small bowel obstruction.  Refusing NG tube placement.  3/1 responded to Gastrografin.  Now persistently hypoxic.  Chest CT and soft tissue neck CT pending.  Hopeful discharge 3/3 as she will likely need home oxygen.     Assessment and Plan  Partial Small Bowel obstruction  Dehydration  Recurrent episodes though none in the past ~2 years.  Refusing NG tube at admission.  Made n.p.o. and given intravenous fluids.  Utilize Prn dilaudid.  2/28 having intermittent episodes of increased abdominal pain.    Declined NG tube placement and Gastrografin.  3/1 agreeable to Gastrografin and responded with good bowel movements.  3/2 SBO resolved and tolerating regular diet.  Lactic acidosis-resolved  Leukocytosis  Initial lactate 2.6.  Normalized after intravenous fluids.  Admission WBC 16.7--> 12.3--> 8.4.  Acute hypoxemic respiratory failure  COPD  Morbid obesity hypoventilation syndrome BMI 47  Patient reports that she had \"borderline\" sleep apnea in the past.  Admits she has gained weight since then.  Continue home as needed albuterol and Symbicort.  Unable to wean patient from oxygen.  Desaturates with activity.  Recommend outpatient sleep study.  3/2 chest CT PE run ordered.  Left clavicular subcutaneous mass  3/2 patient shared with me that her primary care provider noted a clavicular soft tissue mass a couple of months ago and recommended CT imaging.  Has not done this yet.  Nontender.  No dysphagia.  Ordered CT soft tissue neck with and without contrast.  Chest pressure  Coronary artery disease  Last Echo 60% (2017), CTA angio 2017 showed minimal " plaque  Continued on home aspirin.  Has never used Nitrostat in the past.  Chest pain had improved with antacids at home.  2/28 recurrent complaints of chest pressure though accompanying abdominal discomfort.  EKG unremarkable.  Utilize GI cocktail as needed.  Chronic pain syndrome  Depression  Continue home Cymbalta and scheduled Norco.  3/2 discontinued her IV Dilaudid now that SBO has resolved.  Essential hypertension  Continue home Hyzaar 100-25 daily with hold parameters.  Hydralazine available for severe elevations.  Hyperlipidemia  Severe fatty liver  Continue home Zocor.  Noninsulin-dependent type 2 diabetes  A1c 7.8.  Had been on Metformin last year but not taking recently.  Initial blood glucose 251.  Subsequent readings 100-150 while n.p.o.  Follow blood sugars 4 times a day.  Utilize mild sliding scale insulin.  Follow sugars closely with resumption of oral diet.  May need to restart Metformin.  Hypothyroidism  TSH mildly low at 0.27.  Due to acute illness would not adjust levothyroxine dosing at this time.  Continue home levothyroxine 150 mcg daily.  Left adrenal adenoma  Noted on imaging  Clinically may be secretory (cortisol, aldosterone)  Defer dexamethasone suppression until pt's SBO is improved     COVID-19 PCR negative from 2/27/2022  Noted.  Standard precautions.  Anticoagulation   Low Risk/Ambulatory with no VTE prophylaxis indicated  Fluids:  Saline lock  Pain meds: Norco, Tylenol, IV Dilaudid  Therapy: na  Stark:Not present  Current Diet  Orders Placed This Encounter      Regular Diet Adult    Supplements  None    Barriers to Discharge: Hypoxia, CT chest and soft tissue neck    Disposition: Anticipate discharge 3/3    Interval History/Subjective:  Patient continues to be hypoxic requiring 2 L at night.  Has also been desatting with activity.  Abdomen feels much better.  No abdominal pain.  No shortness of breath.  No nausea or vomiting.  Hopeful she can discharge home later today.  Questions  answered to verbalized satisfaction.    Physical Exam/Objective:  Temp:  [97.5  F (36.4  C)-98.1  F (36.7  C)] 98.1  F (36.7  C)  Pulse:  [63-80] 75  Resp:  [16-18] 18  BP: (112-144)/(54-75) 138/66  SpO2:  [88 %-96 %] 92 %  Wt Readings from Last 4 Encounters:   02/27/22 132.5 kg (292 lb 3.2 oz)   07/13/21 135.9 kg (299 lb 11.2 oz)   06/22/21 140.6 kg (310 lb)   06/22/21 140.6 kg (310 lb)     Body mass index is 47.16 kg/m .    Constitutional: awake, alert, cooperative, no apparent distress, and appears stated age and morbidly obese  ENT: Normocephalic, without obvious abnormality, atraumatic, external ears without lesions, oral pharynx with moist mucous membranes, tonsils without erythema or exudates, gums normal and good dentition.  Neck: Subcutaneous mass approximately 7 x 3 cm over the left clavicle.  I do not appreciate enlarged lymphadenopathy.  Could be consistent with lipoma.  Respiratory: Poor air movement but no rhonchi rales nor wheezing.  Cardiovascular: Normal apical impulse, regular rate and rhythm, normal S1 and S2, no S3 or S4, and no murmur noted  GI: No scars, normal bowel sounds, soft, non-distended, non-tender, no masses palpated, no hepatosplenomegally  Skin: normal skin color, texture, turgor, no redness, warmth, or swelling, and no rashes  Musculoskeletal: There is no redness, warmth, or swelling of the joints.  Motor strength is 5 out of 5 all extremities bilaterally.  Tone is normal. no lower extremity pitting edema present  Neurologic: Cranial nerves II-XII are grossly intact. Sensory:  Sensory intact  Neuropsychiatric: General: normal, calm and normal eye contact Level of consciousness: alert / normal Affect: normal Orientation: oriented to self, place, time and situation Memory and insight: normal, memory for past and recent events intact and thought process normal      Medications:   Personally Reviewed.  Medications       allopurinol  300 mg Oral Daily     aspirin  81 mg Oral Daily      DULoxetine  30 mg Oral Daily     fluticasone-vilanterol  1 puff Inhalation Daily     HYDROcodone-acetaminophen  1 tablet Oral TID     insulin aspart  1-3 Units Subcutaneous TID AC     insulin aspart  1-3 Units Subcutaneous At Bedtime     levothyroxine  150 mcg Oral Daily     losartan-hydrochlorothiazide  1 tablet Oral Daily     pantoprazole (PROTONIX) IV  40 mg Intravenous Q24H     simvastatin  20 mg Oral At Bedtime     sodium chloride (PF)  3 mL Intracatheter Q8H       Data reviewed today: I personally reviewed all new medications, labs, imaging/diagnostics reports over the past 24 hours. Pertinent findings include:    Imaging:   Recent Results (from the past 24 hour(s))   Echocardiogram Complete   Result Value    LVEF  55-60%    LVEF  55-60%    Narrative    [id number]  [id number]  [id number]  [id number] [address]     Name: LUIS DAWN  MRN: [id number]  : 1955  Study Date: 2022 02:55 PM  Age: 66 yrs  Gender: Female  Patient Location: [facility]  Reason For Study: SOB  Ordering Physician: DELON MARTÍNEZ  Performed By: ADRIANE     BSA: 2.3 m2  Height: 66 in  Weight: 292 lb  HR: 75  ______________________________________________________________________________  ______________________________________________________________________________  Interpretation Summary     Left ventricular size, wall motion and function are normal. The ejection  fraction is 55-60%.  The visual ejection fraction is 55-60%.  No regional wall motion abnormalities noted.  Regional wall motion abnormalities cannot be excluded due to limited  visualization.  There is no pericardial effusion.  Sinus rhythm was noted.  Consider cariac MRI or PETER for better visualization.  The study was technically difficult.  ______________________________________________________________________________  Left Ventricle  Left ventricular size, wall motion and function are normal. The  ejection  fraction is 55-60%. Diastolic Doppler findings (E/E' ratio and/or other  parameters) suggest left ventricular filling pressures are indeterminate. The  visual ejection fraction is 55-60%. No regional wall motion abnormalities  noted. Regional wall motion abnormalities cannot be excluded due to limited  visualization.     Right Ventricle  Normal right ventricle size and systolic function. TAPSE is normal, which is  consistent with normal right ventricular systolic function.     Atria  The left atrium is not well visualized. Right atrium not well visualized.  There is no color Doppler evidence of an atrial shunt.     Mitral Valve  Mitral valve leaflets appear normal. There is no evidence of mitral stenosis  or clinically significant mitral regurgitation. There is mild (1+) mitral  regurgitation. There is no mitral valve stenosis.     Tricuspid Valve  Right ventricle systolic pressure estimate normal. There is mild to moderate  (1-2+) tricuspid regurgitation. There is no tricuspid stenosis.     Aortic Valve  The aortic valve is not well visualized. No aortic regurgitation is present.  No aortic stenosis is present.     Pulmonic Valve  The pulmonic valve is not well seen, but is grossly normal. There is trace  pulmonic valvular regurgitation. There is no pulmonic valvular stenosis.     Vessels  The aorta root cannot be assessed.     Pericardium  There is no pericardial effusion.     Rhythm  Sinus rhythm was noted.  ______________________________________________________________________________  Doppler Measurements & Calculations  MV E max lupillo: 112.0 cm/sec  MV A max lupillo: 101.9 cm/sec  MV E/A: 1.1  MV dec slope: 454.6 cm/sec2  MV dec time: 0.25 sec  Ao V2 max: 148.0 cm/sec  Ao max P.0 mmHg  Ao V2 mean: 98.7 cm/sec  Ao mean P.7 mmHg  Ao V2 VTI: 34.1 cm  LV V1 max P.8 mmHg  LV V1 max: 109.6 cm/sec  LV V1 VTI: 24.6 cm  PA acc time: 0.10 sec  AV Lupillo Ratio (DI): 0.74  E/E' av.4  Lateral E/e':  11.0  [facility] E/e': 11.7     ______________________________________________________________________________  Report approved by: Hieu Nielson 03/01/2022 03:36 PM         XR Gastrografin Challenge    Narrative    EXAM: XR GASTROGRAFIN CHALLENGE  LOCATION: [facility]  DATE/TIME: 2/28/2022 11:51 AM    INDICATION: Gastrografin UGI Challenge  COMPARISON: CT 2/27/2022  TECHNIQUE: Routine water soluble contrast follow-through challenge.    FINDINGS:  FLUOROSCOPIC TIME: 0 minutes  NUMBER OF FLUOROSCOPIC IMAGES: 0    Free flow of contrast through the small bowel. Contrast freely enters the colon.      Impression    IMPRESSION: Contrast freely enters the colon.       Labs:  XR Gastrografin Challenge   Final Result   IMPRESSION: Contrast freely enters the colon.      Echocardiogram Complete   Final Result      Abd/pelvis CT,  IV  contrast only TRAUMA / AAA   Final Result   IMPRESSION:    1.  Mildly prominent mid small bowel could represent focal adynamic ileus, or mild obstruction.   2.  Small amount adjacent mesenteric edema or scar.   3.  Chronic hepatic steatosis.   4.  Left adrenal 1.5 cm adenoma.        Recent Results (from the past 24 hour(s))   Glucose by meter    Collection Time: 03/01/22  2:52 PM   Result Value Ref Range    GLUCOSE BY METER POCT 103 (H) 70 - 99 mg/dL   Echocardiogram Complete    Collection Time: 03/01/22  3:20 PM   Result Value Ref Range    LVEF  55-60%     LVEF  55-60%    Glucose by meter    Collection Time: 03/01/22  9:51 PM   Result Value Ref Range    GLUCOSE BY METER POCT 147 (H) 70 - 99 mg/dL   Glucose by meter    Collection Time: 03/02/22  7:21 AM   Result Value Ref Range    GLUCOSE BY METER POCT 132 (H) 70 - 99 mg/dL       Pending Labs:  Unresulted Labs Ordered in the Past 30 Days of this Admission     No orders found from 1/28/2022 to 2/28/2022.          Gustabo Calvin MD  Hospitalist  [facility] Medicine  [facility]  Phone:  #[phone]

## 2022-03-02 NOTE — PLAN OF CARE
Problem: Pain Acute  Goal: Acceptable Pain Control and Functional Ability  Outcome: Ongoing, Progressing  Intervention: Develop Pain Management Plan  Recent Flowsheet Documentation  Taken 3/1/2022 2329 by Leigha Robertson RN  Pain Management Interventions: medication (see MAR)   Goal Outcome Evaluation:      Pt requested IV dilaudid at the start of this shift for abdominal pain, pt was due for PO scheduled Bonifay but patient declined and stated she wanted IV dilaudid instead. RN educated patient that we needed to do scheduled PO pain medications first prior to IV pain meds, pt was in agreement with this. Checked on patient 1 hour later and patient was sleeping. Pt did request IV dilaudid around 2330 for 7/10 abdominal pain, as RN was giving pain medication patient was falling back to sleep and snoring.     Pt has tolerated full liquid diet, has had numerous stools which have slowed down throughout this shift and wants to advance to regular diet. IVF were stopped per orders d/t tolerating diet.    Leigha Robertson, SEJAL  3/2/2022  4:25 AM

## 2022-03-02 NOTE — PROGRESS NOTES
Pt needs a AC 18 gauge IV for CT imaging. 2 attempts have been made; unsuccessful. Currently on SWAT nurse's waiting list.

## 2022-03-02 NOTE — PROGRESS NOTES
Nutrition Therapy   Brief Follow-up     Followed-up regarding diet education as the other day she asked me to come back. Pt states she felt comfortable at this time with the plan to change when giving insulin and she will be working with her insurance monthly on improving her glycemic control. Refused further teaching at this time. Diet now advanced to regular with 100% of adequate meals. Will modify diet to 60g consistent carbohydrate. Blood sugars now wnl.

## 2022-03-02 NOTE — SIGNIFICANT EVENT
Significant Event Note    Description of event:    Still requiring supplemental oxygen 1-2 liters and have been awaiting Ct results.  Just came in now.  CT chest- No PE.  Mild emphysema.  Patchy atelectasis.    CT neck- per verbal report from radiology- no concerning findings    Plan:  Will stay overnight as she may need home oxygen and cannot setup at this time.  3/3 in AM will do home oxygen assessment.    Discussed with: bedside nurse    Gustabo Calvin MD

## 2022-03-03 ENCOUNTER — DOCUMENTATION ONLY (OUTPATIENT)
Dept: HOME HEALTH SERVICES | Facility: CLINIC | Age: 67
End: 2022-03-03

## 2022-03-03 VITALS
BODY MASS INDEX: 46.96 KG/M2 | HEIGHT: 66 IN | TEMPERATURE: 97.7 F | HEART RATE: 70 BPM | SYSTOLIC BLOOD PRESSURE: 144 MMHG | RESPIRATION RATE: 20 BRPM | DIASTOLIC BLOOD PRESSURE: 74 MMHG | OXYGEN SATURATION: 94 % | WEIGHT: 292.2 LBS

## 2022-03-03 LAB
GLUCOSE BLDC GLUCOMTR-MCNC: 140 MG/DL (ref 70–99)
GLUCOSE BLDC GLUCOMTR-MCNC: 149 MG/DL (ref 70–99)
GLUCOSE BLDC GLUCOMTR-MCNC: 157 MG/DL (ref 70–99)

## 2022-03-03 PROCEDURE — 250N000013 HC RX MED GY IP 250 OP 250 PS 637: Performed by: HOSPITALIST

## 2022-03-03 PROCEDURE — 99239 HOSP IP/OBS DSCHRG MGMT >30: CPT | Performed by: INTERNAL MEDICINE

## 2022-03-03 PROCEDURE — 250N000013 HC RX MED GY IP 250 OP 250 PS 637: Performed by: FAMILY MEDICINE

## 2022-03-03 RX ADMIN — ALLOPURINOL 300 MG: 300 TABLET ORAL at 08:11

## 2022-03-03 RX ADMIN — HYDROCODONE BITARTRATE AND ACETAMINOPHEN 1 TABLET: 7.5; 325 TABLET ORAL at 09:31

## 2022-03-03 RX ADMIN — ACETAMINOPHEN 975 MG: 325 TABLET ORAL at 06:10

## 2022-03-03 RX ADMIN — ASPIRIN 81 MG: 81 TABLET, COATED ORAL at 08:11

## 2022-03-03 RX ADMIN — LEVOTHYROXINE SODIUM 150 MCG: 0.05 TABLET ORAL at 06:10

## 2022-03-03 RX ADMIN — FLUTICASONE FUROATE AND VILANTEROL TRIFENATATE 1 PUFF: 200; 25 POWDER RESPIRATORY (INHALATION) at 08:11

## 2022-03-03 RX ADMIN — INSULIN ASPART 1 UNITS: 100 INJECTION, SOLUTION INTRAVENOUS; SUBCUTANEOUS at 11:37

## 2022-03-03 RX ADMIN — DULOXETINE 30 MG: 30 CAPSULE, DELAYED RELEASE ORAL at 08:11

## 2022-03-03 RX ADMIN — LOSARTAN POTASSIUM AND HYDROCHLOROTHIAZIDE 1 TABLET: 100; 25 TABLET, FILM COATED ORAL at 08:11

## 2022-03-03 RX ADMIN — INSULIN ASPART 1 UNITS: 100 INJECTION, SOLUTION INTRAVENOUS; SUBCUTANEOUS at 08:10

## 2022-03-03 ASSESSMENT — ACTIVITIES OF DAILY LIVING (ADL)
ADLS_ACUITY_SCORE: 7

## 2022-03-03 NOTE — DISCHARGE SUMMARY
[facility] MEDICINE  DISCHARGE SUMMARY     Primary Care Physician: Lex Gauthier  Admission Date: 2/27/2022   Discharge Provider: Osvaldo Larson DO Discharge Date: 3/3/2022   Diet:   Active Diet and Nourishment Order   Procedures     Moderate Consistent Carb (60 g CHO per Meal) Diet     Diet       Code Status: Full Code   Activity: DCACTIVITY: Activity as tolerated        Condition at Discharge: Stable     REASON FOR PRESENTATION(See Admission Note for Details)   Abdominal pain, nausea.     PRINCIPAL & ACTIVE DISCHARGE DIAGNOSES     Active Problems:  Partial small bowel obstruction (H)  Dehydration  Abdominal pain, generalized  Lactic acidosis  Acute respiratory failure with hypoxia  Chronic obstructive pulmonary disease  Morbid obesity Body mass index is 47.16 kg/m .  Likely obesity hypoventilation syndrome  Left supraclavicular lymphadenopathy, chronic.  Chronic pain syndrome  Depression  Essential hypertension  Hyperlipidemia  Fatty liver disease  Non-insulin-dependent diabetes mellitus type 2  Hypothyroidism  Left adrenal adenoma    PENDING LABS     Unresulted Labs Ordered in the Past 30 Days of this Admission     No orders found from 1/28/2022 to 2/28/2022.        PROCEDURES ( this hospitalization only)      N/A    RECOMMENDATIONS TO OUTPATIENT PROVIDER FOR F/U VISIT     Follow-up Appointments     Follow-up and recommended labs and tests       Follow up with primary care provider, Lex Gauthier, within 7 days for   hospital follow- up and regarding new diagnosis.  The following labs/tests   are recommended: US guided biopsy, left clavicular lymph node,   Polysomnography.  Consider referral to lung or sleep clinic.           DISPOSITION     Home    SUMMARY OF HOSPITAL COURSE:      66-year-old female with history of coronary disease, morbid obesity, COPD, and multiple abdominal surgeries in the past who presents with recurrent partial small bowel obstruction.   Patient refused NG tube placement eventually agreed to Gastrografin challenge on March 1, 2022.  She had good response with regular bowel movements.  Diet was advanced and she tolerated this.  Small bowel obstructions are likely related to adhesions from previous surgeries.  Patient elected for conservative management and general surgery was not consulted.    During her hospitalization she was noted to require supplemental oxygen.  She was unable to wean patient from oxygen.  CT PE protocol was performed and was unremarkable except for mild emphysema and atelectasis.  She especially desaturated with activity.  A home oxygen test was performed on March 3, 2022 which revealed increased oxygen requirement with activity.  Patient responded well to 2 L/min via nasal cannula with activity.  Patient also has known about a left supraclavicular mass over the past 4 months.  She was planned to have an outpatient CT soft tissue neck with and without contrast which was performed during her hospitalization.  Results indicated at 1.5 x 1.5 to a node.  Radiology recommending ultrasound-guided tissue sampling which can be arranged through primary care office.    Discharge Medications with Med changes:     Current Discharge Medication List      CONTINUE these medications which have NOT CHANGED    Details   albuterol (PROAIR HFA;PROVENTIL HFA;VENTOLIN HFA) 90 mcg/actuation inhaler [ALBUTEROL (PROAIR HFA;PROVENTIL HFA;VENTOLIN HFA) 90 MCG/ACTUATION INHALER] Inhale 2 puffs every 6 (six) hours as needed for wheezing.       allopurinoL (ZYLOPRIM) 300 MG tablet [ALLOPURINOL (ZYLOPRIM) 300 MG TABLET] Take 300 mg by mouth daily.      aspirin (ASA) 81 MG EC tablet Take 81 mg by mouth daily      budesonide-formoterol (SYMBICORT) 160-4.5 mcg/actuation inhaler [BUDESONIDE-FORMOTEROL (SYMBICORT) 160-4.5 MCG/ACTUATION INHALER] Inhale 2 puffs every morning.       diphenhydrAMINE (BENADRYL) 25 mg capsule Take 25 mg by mouth every 6 hours as needed  for itching       DULoxetine (CYMBALTA) 30 MG capsule Take 30 mg by mouth daily      HYDROcodone-acetaminophen (NORCO) 7.5-325 mg per tablet [HYDROCODONE-ACETAMINOPHEN (NORCO) 7.5-325 MG PER TABLET] Take 1 tablet by mouth 3 (three) times a day.       levothyroxine (SYNTHROID/LEVOTHROID) 150 MCG tablet Take 150 mcg by mouth daily      losartan-hydrochlorothiazide (HYZAAR) 100-25 MG tablet Take 1 tablet by mouth daily      nitroglycerin (NITROSTAT) 0.4 MG SL tablet [NITROGLYCERIN (NITROSTAT) 0.4 MG SL TABLET] Place 1 tablet (0.4 mg total) under the tongue daily as needed for chest pain.  Qty: 30 tablet, Refills: 0    Associated Diagnoses: Chest pain of uncertain etiology      simvastatin (ZOCOR) 20 MG tablet [SIMVASTATIN (ZOCOR) 20 MG TABLET] Take 20 mg by mouth at bedtime.                   Rationale for medication changes:      No changes        Consults     SOCIAL WORK IP CONSULT  CARE MANAGEMENT / SOCIAL WORK IP CONSULT    Immunizations given this encounter     Most Recent Immunizations   Administered Date(s) Administered     COVID-19,PF,Moderna 05/07/2021     Influenza Vaccine IM > 6 months Valent IIV4 (Alfuria,Fluzone) 09/16/2021           Anticoagulation Information      Recent INR results: No results for input(s): INR in the last 168 hours.  Warfarin doses (if applicable) or name of other anticoagulant: N/A low risk.      SIGNIFICANT IMAGING FINDINGS     Results for orders placed or performed during the hospital encounter of 02/27/22   Abd/pelvis CT,  IV  contrast only TRAUMA / AAA    Impression    IMPRESSION:   1.  Mildly prominent mid small bowel could represent focal adynamic ileus, or mild obstruction.  2.  Small amount adjacent mesenteric edema or scar.  3.  Chronic hepatic steatosis.  4.  Left adrenal 1.5 cm adenoma.   XR Gastrografin Challenge    Impression    IMPRESSION: Contrast freely enters the colon.   CT Chest Pulmonary Embolism w Contrast    Impression    IMPRESSION:  1.  No pulmonary  embolus.  2.  Mild emphysema.  3.  Patchy bibasilar atelectasis/scarring.  4.  Hepatic steatosis.   CT Soft Tissue Neck w Contrast    Impression    IMPRESSION:   1.  No CT evidence of discrete mucosal mass lesion in the aerodigestive tract.  2.  1.5 x 1.5 cm left level 2A node, nonspecific. If it persists, it could be amenable to ultrasound-guided tissue sampling.  3.  There are also several nonspecific other scattered bilateral cervical lymph nodes which are not enlarged by size criteria.      Discussed the findings with Dr. Peña at 4:45 PM, 03/02/2022.   Echocardiogram Complete   Result Value Ref Range    LVEF  55-60%     LVEF  55-60%        SIGNIFICANT LABORATORY FINDINGS     Most Recent 3 CBC's:Recent Labs   Lab Test 03/01/22  0458 02/28/22  0822 02/27/22  1551   WBC 8.4 12.3* 16.7*   HGB 12.1 13.4 14.1   MCV 94 93 89    372 371     Most Recent 3 BMP's:Recent Labs   Lab Test 03/03/22  1134 03/03/22  0723 03/03/22  0208 02/27/22  1839 02/27/22  1551 01/31/22  1402 08/31/21  1429   NA  --   --   --   --  137 138 139   POTASSIUM  --   --   --   --  3.9 3.7 4.0   CHLORIDE  --   --   --   --  97* 99 101   CO2  --   --   --   --  26 26 27   BUN  --   --   --   --  11 14 13   CR  --   --   --   --  0.75 0.84 0.79   ANIONGAP  --   --   --   --  14 13 11   KALINA  --   --   --   --  8.9 10.2 9.7   * 140* 149*   < > 251* 282* 240*    < > = values in this interval not displayed.     Most Recent 2 LFT's:Recent Labs   Lab Test 02/27/22  1551 08/31/21  1429   AST 25 14   ALT 34 20   ALKPHOS 84 77   BILITOTAL 0.5 0.2     Most Recent 3 INR's:Recent Labs   Lab Test 06/17/21  1132   INR 0.98     Most Recent Urinalysis:Recent Labs   Lab Test 07/05/21  1530   COLOR Light Yellow   APPEARANCE Hazy*   URINEGLC Negative   URINEBILI Negative   URINEKETONE Negative   SG 1.014   UBLD Negative   URINEPH 5.0   PROTEIN Negative   UROBILINOGEN <2.0 mg/dL   NITRITE Negative   LEUKEST Negative   RBCU 2   WBCU 2     Discharge  Orders        Reason for your hospital stay    Partial small bowel obstruction.     Follow-up and recommended labs and tests     Follow up with primary care provider, Lex Gauthier, within 7 days for hospital follow- up and regarding new diagnosis.  The following labs/tests are recommended: US guided biopsy, left clavicular lymph node, Polysomnography.  Consider referral to lung or sleep clinic.     Activity    Your activity upon discharge: activity as tolerated     When to contact your care team    Call your primary doctor if you have any of the following: temperature greater than 101 degrees Fahrenheit,  increased shortness of breath, or increased abdominal pain.     Oxygen Adult/Peds     Diet    Follow this diet upon discharge: Orders Placed This Encounter      Low fiber diet for 5 days, then may resume regular diet.       Examination   Physical Exam   Temp:  [97.7  F (36.5  C)-98.1  F (36.7  C)] 97.7  F (36.5  C)  Pulse:  [70-91] 70  Resp:  [18-20] 20  BP: (122-144)/(56-75) 144/74  SpO2:  [84 %-96 %] 94 %  Wt Readings from Last 1 Encounters:   02/27/22 132.5 kg (292 lb 3.2 oz)       Constitutional: awake, alert, cooperative, no apparent distress, and appears stated age  Eyes: Lids and lashes normal, pupils equal, round and reactive to light, extra ocular muscles intact, sclera clear, conjunctiva normal  Hematologic / Lymphatic: supraclavicular lymphadenopathy noted on left, non tender.   Respiratory: No increased work of breathing, good air exchange, clear to auscultation bilaterally, no crackles or wheezing  Cardiovascular: Normal apical impulse, regular rate and rhythm, normal S1 and S2, no S3 or S4, and no murmur noted  Skin: no bruising or bleeding, normal skin color, texture, turgor and no redness, warmth, or swelling  Gastrointestinal: Abdomen is obese, soft to palpation.  No guarding or rebound tenderness.  Normoactive bowel sounds in all 4 quadrants.  Musculoskeletal: There is no redness, warmth, or  swelling of the joints.  Full range of motion noted.  Motor strength is 5 out of 5 all extremities bilaterally.  Tone is normal.  Neurologic: Awake, alert, oriented to name, place and time.  Cranial nerves II-XII are grossly intact.  Motor is 5 out of 5 bilaterally.  Cerebellar finger to nose, heel to shin intact.  Sensory is intact.  Babinski down going, Romberg negative, and gait is normal.  Neuropsychiatric: General: normal, calm and normal eye contact  Affect: normal  Orientation: oriented to self, place, time and situation      Please see EMR for more detailed significant labs, imaging, consultant notes etc.    IOsvaldo DO, personally saw the patient today and spent greater than 30 minutes discharging this patient.    Osvaldo Larson DO  [facility]    CC:Lex Gauthier

## 2022-03-03 NOTE — PROGRESS NOTES
Pt is AOx4. VSS on 1-2L NC; down from 3L.Pt may require more O2 while sleeping. ORR. Up with SBA; pt uses cane from home. Activity encouraged.Tolerating mod carb diet. Nutrition consult today; changed diet from regular diet. BS checks q4hr; only required 1 unit at lunch and dinner. Denies N/V. Continent of B/B; pt using BSC. Diarrhea is slowing. Pain controlled with scheduled [place]. PIV; SL. Pt went down to CT today; see MD note for results. Plan is for a home O2 consult tomorrow morning with hopes to discharge per MD note. Continue to monitor

## 2022-03-03 NOTE — PROGRESS NOTES
Care Management Discharge Note    Discharge Date: 03/03/2022       Discharge Disposition: Home    Discharge Services: None    Discharge DME:  Per therapy    Discharge Transportation: family or friend will provide    Private pay costs discussed: Not applicable    Education Provided on the Discharge Plan:  N/A  Persons Notified of Discharge Plans: patient  Patient/Family in Agreement with the Plan: yes    Handoff Referral Completed: N/A    Additional Information:  Chart reviewed. Pt lives with daughter. Pt plans to discharge home today. Family to transport. No further CM needs desired or identified.      SHEILA Marie Intern

## 2022-03-03 NOTE — PROGRESS NOTES
Home Oxygen Assessment Tools  Patient's 02 sat on RA at rest 90% for 5 minutes. Patient is on 2LPM  (02 device) Sp02 94 %, after minutes of standing on the side of the bed. If patient's Sp02 at rest is less than 88%, then oxygen may be needed and must monitor patient's Sp02 with activity. Patient 02 83% sat on  RA with activity after 2 minutes. Patient's Sp02 93% on 2 LPM after 2 minutes of activity.     Room air: Rest 90% Activity 83%    2L nasal cannula: Rest 94% Activity 93%

## 2022-03-03 NOTE — PROGRESS NOTES
Oxygen Documentation:   I certify that this patient, Sharmila Shore has been under my care. This is the face-to-face encounter for oxygen medical necessity.      Sharmila Shore is now in a chronic stable state and continues to require supplemental oxygen. Patient has continued oxygen desaturation due to Emphysema J43.9.    Alternative treatment(s) tried or considered and deemed clinically infective for treatment of Emphysema J43.9 include inhalers and pulmonary toileting.  If portability is ordered, is the patient mobile within the home? yes      Osvaldo Larson DO, MS  [facility] Service  Internal Medicine

## 2022-03-03 NOTE — PROGRESS NOTES
[facility] DISCHARGE NOTE    Patient discharged to home at 2:00 PM via wheel chair. Accompanied by daughter and staff. Discharge instructions reviewed with patient, opportunity offered to ask questions. Prescriptions - None ordered for discharge. Oxygen concentrator sent with patient at discharge. All belongings sent with patient.    Celina Bull RN

## 2022-03-03 NOTE — PROGRESS NOTES
1141AM - Received initial oxygen intake.  Reviewed chart and all documents are present.  1159am:  Called [facility] and spoke with SEJAL Rincon.  Let her know we have received all docs and when is discharge?  Mckenzie states basically waiting for the O2.  Asked to be transferred to patient room.   1205pm:  Spoke with patient.  She has had O2 from [facility] in the past and was very please with her service.  Pt inquired about getting a small shoulder unit?  Told her that once she's been out of the hospital for approx 2-3 weeks, call us back and we can go over all the information for the pulse dose units.  Pt understands.  Pt is fine with receiving the POC for now.  Let her know we will be there with in 2 hours.  Pt understands.

## 2022-03-03 NOTE — PLAN OF CARE
Problem: Gas Exchange Impaired  Goal: Optimal Gas Exchange  Outcome: Ongoing, Progressing  Intervention: Optimize Oxygenation and Ventilation   Goal Outcome Evaluation:      Pt O2 saturation at 87% at beginning of shift, did not have her nasal cannula on, sitting in bed, cannula was put back in, O2 saturation up to 93% with 1L, Pt educated on O2 safety and the importance of keeping it in. Pt refuses continuous pulse oximetry, spot checking. At 0000, assessed pt again and sats were 85%, the cannula has fallen off, was put back in, sated at 87-88% with 1L, upped O2 to 2L, sating at 96%, pt fell back asleep. Will continue to monitor.

## 2022-03-03 NOTE — DISCHARGE INSTRUCTIONS
Oxygen Provider:  Arranged through [employer], contact number [phone].  If you have any questions or concerns please call the oxygen company directly.

## 2022-03-04 ENCOUNTER — MEDICAL CORRESPONDENCE (OUTPATIENT)
Dept: HEALTH INFORMATION MANAGEMENT | Facility: CLINIC | Age: 67
End: 2022-03-04
Payer: COMMERCIAL

## 2022-03-10 DIAGNOSIS — J44.9 COPD (CHRONIC OBSTRUCTIVE PULMONARY DISEASE) (H): Primary | ICD-10-CM

## 2022-03-24 ENCOUNTER — HOSPITAL ENCOUNTER (OUTPATIENT)
Dept: ULTRASOUND IMAGING | Facility: CLINIC | Age: 67
Discharge: HOME | End: 2022-03-24
Attending: FAMILY MEDICINE
Payer: COMMERCIAL

## 2022-03-24 DIAGNOSIS — R59.0 SUPRACLAVICULAR ADENOPATHY: ICD-10-CM

## 2022-03-24 PROCEDURE — 88173 CYTOPATH EVAL FNA REPORT: CPT | Mod: TC

## 2022-03-24 PROCEDURE — 88184 FLOWCYTOMETRY/ TC 1 MARKER: CPT

## 2022-03-24 PROCEDURE — 88185 FLOWCYTOMETRY/TC ADD-ON: CPT

## 2022-03-24 PROCEDURE — 272N000431 US BIOPSY CORE LYMPH NODE

## 2022-03-24 PROCEDURE — 272N000221 US BIOPSY CORE LYMPH NODE

## 2022-03-24 PROCEDURE — 10005 FNA BX W/US GDN 1ST LES: CPT

## 2022-03-24 PROCEDURE — 88189 FLOWCYTOMETRY/READ 16 & >: CPT | Mod: GC | Performed by: PATHOLOGY

## 2022-03-25 LAB
PATH REPORT.COMMENTS IMP SPEC: NORMAL
PATH REPORT.FINAL DX SPEC: NORMAL
PATH REPORT.MICROSCOPIC SPEC OTHER STN: NORMAL
PATH REPORT.RELEVANT HX SPEC: NORMAL

## 2022-03-28 PROCEDURE — 88341 IMHCHEM/IMCYTCHM EA ADD ANTB: CPT | Mod: 26 | Performed by: PATHOLOGY

## 2022-03-28 PROCEDURE — 88342 IMHCHEM/IMCYTCHM 1ST ANTB: CPT | Mod: 26 | Performed by: PATHOLOGY

## 2022-03-28 PROCEDURE — 88172 CYTP DX EVAL FNA 1ST EA SITE: CPT | Mod: 26 | Performed by: PATHOLOGY

## 2022-03-28 PROCEDURE — 88173 CYTOPATH EVAL FNA REPORT: CPT | Mod: 26 | Performed by: PATHOLOGY

## 2022-03-28 PROCEDURE — 88305 TISSUE EXAM BY PATHOLOGIST: CPT | Mod: 26 | Performed by: PATHOLOGY

## 2022-03-28 PROCEDURE — 88360 TUMOR IMMUNOHISTOCHEM/MANUAL: CPT | Mod: 26 | Performed by: PATHOLOGY

## 2022-03-30 LAB
PATH REPORT.ADDENDUM SPEC: NORMAL
PATH REPORT.COMMENTS IMP SPEC: NORMAL
PATH REPORT.FINAL DX SPEC: NORMAL
PATH REPORT.GROSS SPEC: NORMAL
PATH REPORT.RELEVANT HX SPEC: NORMAL

## 2022-05-23 ENCOUNTER — LAB REQUISITION (OUTPATIENT)
Dept: LAB | Facility: CLINIC | Age: 67
End: 2022-05-23

## 2022-05-23 DIAGNOSIS — E03.9 HYPOTHYROIDISM, UNSPECIFIED: ICD-10-CM

## 2022-05-23 LAB — TSH SERPL DL<=0.005 MIU/L-ACNC: 1.13 UIU/ML (ref 0.3–5)

## 2022-05-23 PROCEDURE — 84443 ASSAY THYROID STIM HORMONE: CPT | Performed by: FAMILY MEDICINE

## 2022-09-13 ENCOUNTER — LAB REQUISITION (OUTPATIENT)
Dept: LAB | Facility: CLINIC | Age: 67
End: 2022-09-13

## 2022-09-13 DIAGNOSIS — I10 ESSENTIAL (PRIMARY) HYPERTENSION: ICD-10-CM

## 2022-09-13 DIAGNOSIS — E78.5 HYPERLIPIDEMIA, UNSPECIFIED: ICD-10-CM

## 2022-09-13 DIAGNOSIS — Z87.39 PERSONAL HISTORY OF OTHER DISEASES OF THE MUSCULOSKELETAL SYSTEM AND CONNECTIVE TISSUE: ICD-10-CM

## 2022-09-13 DIAGNOSIS — E55.9 VITAMIN D DEFICIENCY, UNSPECIFIED: ICD-10-CM

## 2022-09-13 PROCEDURE — 84550 ASSAY OF BLOOD/URIC ACID: CPT | Performed by: FAMILY MEDICINE

## 2022-09-13 PROCEDURE — 82306 VITAMIN D 25 HYDROXY: CPT | Performed by: FAMILY MEDICINE

## 2022-09-13 PROCEDURE — 80053 COMPREHEN METABOLIC PANEL: CPT | Performed by: FAMILY MEDICINE

## 2022-09-13 PROCEDURE — 80061 LIPID PANEL: CPT | Performed by: FAMILY MEDICINE

## 2022-09-14 LAB
ALBUMIN SERPL BCG-MCNC: 3.9 G/DL (ref 3.5–5.2)
ALP SERPL-CCNC: 85 U/L (ref 35–104)
ALT SERPL W P-5'-P-CCNC: 19 U/L (ref 10–35)
ANION GAP SERPL CALCULATED.3IONS-SCNC: 11 MMOL/L (ref 7–15)
AST SERPL W P-5'-P-CCNC: 16 U/L (ref 10–35)
BILIRUB SERPL-MCNC: 0.2 MG/DL
BUN SERPL-MCNC: 17.7 MG/DL (ref 8–23)
CALCIUM SERPL-MCNC: 9.4 MG/DL (ref 8.8–10.2)
CHLORIDE SERPL-SCNC: 100 MMOL/L (ref 98–107)
CHOLEST SERPL-MCNC: 154 MG/DL
CREAT SERPL-MCNC: 0.89 MG/DL (ref 0.51–0.95)
DEPRECATED HCO3 PLAS-SCNC: 29 MMOL/L (ref 22–29)
GFR SERPL CREATININE-BSD FRML MDRD: 72 ML/MIN/1.73M2
GLUCOSE SERPL-MCNC: 255 MG/DL (ref 70–99)
HDLC SERPL-MCNC: 29 MG/DL
LDLC SERPL CALC-MCNC: 69 MG/DL
NONHDLC SERPL-MCNC: 125 MG/DL
POTASSIUM SERPL-SCNC: 3.8 MMOL/L (ref 3.4–5.3)
PROT SERPL-MCNC: 6.5 G/DL (ref 6.4–8.3)
SODIUM SERPL-SCNC: 140 MMOL/L (ref 136–145)
TRIGL SERPL-MCNC: 282 MG/DL
URATE SERPL-MCNC: 5.9 MG/DL (ref 2.4–5.7)

## 2022-09-15 LAB — DEPRECATED CALCIDIOL+CALCIFEROL SERPL-MC: 40 UG/L (ref 20–75)

## 2023-01-09 ENCOUNTER — LAB REQUISITION (OUTPATIENT)
Dept: LAB | Facility: CLINIC | Age: 68
End: 2023-01-09

## 2023-01-09 DIAGNOSIS — E03.9 HYPOTHYROIDISM, UNSPECIFIED: ICD-10-CM

## 2023-01-09 PROCEDURE — 84443 ASSAY THYROID STIM HORMONE: CPT | Performed by: FAMILY MEDICINE

## 2023-01-10 LAB — TSH SERPL DL<=0.005 MIU/L-ACNC: 0.66 UIU/ML (ref 0.3–4.2)

## 2023-07-24 ENCOUNTER — LAB REQUISITION (OUTPATIENT)
Dept: LAB | Facility: CLINIC | Age: 68
End: 2023-07-24

## 2023-07-24 DIAGNOSIS — I10 ESSENTIAL (PRIMARY) HYPERTENSION: ICD-10-CM

## 2023-07-24 DIAGNOSIS — Z87.39 PERSONAL HISTORY OF OTHER DISEASES OF THE MUSCULOSKELETAL SYSTEM AND CONNECTIVE TISSUE: ICD-10-CM

## 2023-07-24 DIAGNOSIS — E03.9 HYPOTHYROIDISM, UNSPECIFIED: ICD-10-CM

## 2023-07-24 DIAGNOSIS — E11.9 TYPE 2 DIABETES MELLITUS WITHOUT COMPLICATIONS (H): ICD-10-CM

## 2023-07-24 PROCEDURE — 84443 ASSAY THYROID STIM HORMONE: CPT | Performed by: FAMILY MEDICINE

## 2023-07-24 PROCEDURE — 84550 ASSAY OF BLOOD/URIC ACID: CPT | Performed by: FAMILY MEDICINE

## 2023-07-24 PROCEDURE — 80053 COMPREHEN METABOLIC PANEL: CPT | Performed by: FAMILY MEDICINE

## 2023-07-24 PROCEDURE — 80061 LIPID PANEL: CPT | Performed by: FAMILY MEDICINE

## 2023-07-25 LAB
ALBUMIN SERPL BCG-MCNC: 4.2 G/DL (ref 3.5–5.2)
ALP SERPL-CCNC: 90 U/L (ref 35–104)
ALT SERPL W P-5'-P-CCNC: 19 U/L (ref 0–50)
ANION GAP SERPL CALCULATED.3IONS-SCNC: 13 MMOL/L (ref 7–15)
AST SERPL W P-5'-P-CCNC: 20 U/L (ref 0–45)
BILIRUB SERPL-MCNC: 0.2 MG/DL
BUN SERPL-MCNC: 15.4 MG/DL (ref 8–23)
CALCIUM SERPL-MCNC: 9.6 MG/DL (ref 8.8–10.2)
CHLORIDE SERPL-SCNC: 99 MMOL/L (ref 98–107)
CHOLEST SERPL-MCNC: 173 MG/DL
CREAT SERPL-MCNC: 0.72 MG/DL (ref 0.51–0.95)
DEPRECATED HCO3 PLAS-SCNC: 26 MMOL/L (ref 22–29)
GFR SERPL CREATININE-BSD FRML MDRD: >90 ML/MIN/1.73M2
GLUCOSE SERPL-MCNC: 110 MG/DL (ref 70–99)
HDLC SERPL-MCNC: 33 MG/DL
LDLC SERPL CALC-MCNC: 97 MG/DL
NONHDLC SERPL-MCNC: 140 MG/DL
POTASSIUM SERPL-SCNC: 3.9 MMOL/L (ref 3.4–5.3)
PROT SERPL-MCNC: 6.7 G/DL (ref 6.4–8.3)
SODIUM SERPL-SCNC: 138 MMOL/L (ref 136–145)
TRIGL SERPL-MCNC: 215 MG/DL
TSH SERPL DL<=0.005 MIU/L-ACNC: 0.41 UIU/ML (ref 0.3–4.2)
URATE SERPL-MCNC: 5.4 MG/DL (ref 2.4–5.7)

## 2023-12-20 ENCOUNTER — LAB REQUISITION (OUTPATIENT)
Dept: LAB | Facility: CLINIC | Age: 68
End: 2023-12-20

## 2023-12-20 DIAGNOSIS — I10 ESSENTIAL (PRIMARY) HYPERTENSION: ICD-10-CM

## 2023-12-20 DIAGNOSIS — R22.1 LOCALIZED SWELLING, MASS AND LUMP, NECK: ICD-10-CM

## 2023-12-20 PROCEDURE — 84443 ASSAY THYROID STIM HORMONE: CPT | Performed by: STUDENT IN AN ORGANIZED HEALTH CARE EDUCATION/TRAINING PROGRAM

## 2023-12-20 PROCEDURE — 80048 BASIC METABOLIC PNL TOTAL CA: CPT | Performed by: STUDENT IN AN ORGANIZED HEALTH CARE EDUCATION/TRAINING PROGRAM

## 2023-12-21 LAB
ANION GAP SERPL CALCULATED.3IONS-SCNC: 13 MMOL/L (ref 7–15)
BUN SERPL-MCNC: 14 MG/DL (ref 8–23)
CALCIUM SERPL-MCNC: 9 MG/DL (ref 8.8–10.2)
CHLORIDE SERPL-SCNC: 102 MMOL/L (ref 98–107)
CREAT SERPL-MCNC: 0.7 MG/DL (ref 0.51–0.95)
DEPRECATED HCO3 PLAS-SCNC: 25 MMOL/L (ref 22–29)
EGFRCR SERPLBLD CKD-EPI 2021: >90 ML/MIN/1.73M2
GLUCOSE SERPL-MCNC: 165 MG/DL (ref 70–99)
POTASSIUM SERPL-SCNC: 3.8 MMOL/L (ref 3.4–5.3)
SODIUM SERPL-SCNC: 140 MMOL/L (ref 135–145)
TSH SERPL DL<=0.005 MIU/L-ACNC: 0.02 UIU/ML (ref 0.3–4.2)

## 2024-05-13 ENCOUNTER — TRANSFERRED RECORDS (OUTPATIENT)
Dept: HEALTH INFORMATION MANAGEMENT | Facility: CLINIC | Age: 69
End: 2024-05-13

## 2024-05-13 ENCOUNTER — LAB REQUISITION (OUTPATIENT)
Dept: LAB | Facility: CLINIC | Age: 69
End: 2024-05-13

## 2024-05-13 DIAGNOSIS — R41.89 OTHER SYMPTOMS AND SIGNS INVOLVING COGNITIVE FUNCTIONS AND AWARENESS: ICD-10-CM

## 2024-05-13 LAB
FOLATE SERPL-MCNC: 8.1 NG/ML (ref 4.6–34.8)
HBA1C MFR BLD: 6.1 % (ref 4.2–6.1)
TSH SERPL DL<=0.005 MIU/L-ACNC: 1.21 UIU/ML (ref 0.3–4.2)
VIT B12 SERPL-MCNC: 521 PG/ML (ref 232–1245)

## 2024-05-13 PROCEDURE — 82746 ASSAY OF FOLIC ACID SERUM: CPT | Performed by: STUDENT IN AN ORGANIZED HEALTH CARE EDUCATION/TRAINING PROGRAM

## 2024-05-13 PROCEDURE — 84443 ASSAY THYROID STIM HORMONE: CPT | Performed by: STUDENT IN AN ORGANIZED HEALTH CARE EDUCATION/TRAINING PROGRAM

## 2024-05-13 PROCEDURE — 82607 VITAMIN B-12: CPT | Performed by: STUDENT IN AN ORGANIZED HEALTH CARE EDUCATION/TRAINING PROGRAM

## 2024-10-07 ENCOUNTER — HOSPITAL ENCOUNTER (OUTPATIENT)
Dept: CARDIOLOGY | Facility: CLINIC | Age: 69
Discharge: HOME | End: 2024-10-07
Attending: STUDENT IN AN ORGANIZED HEALTH CARE EDUCATION/TRAINING PROGRAM
Payer: COMMERCIAL

## 2024-10-07 ENCOUNTER — HOSPITAL ENCOUNTER (OUTPATIENT)
Dept: NUCLEAR MEDICINE | Facility: CLINIC | Age: 69
Discharge: HOME | End: 2024-10-07
Attending: STUDENT IN AN ORGANIZED HEALTH CARE EDUCATION/TRAINING PROGRAM
Payer: COMMERCIAL

## 2024-10-07 DIAGNOSIS — R06.09 DYSPNEA ON EXERTION: ICD-10-CM

## 2024-10-07 LAB
CV STRESS CURRENT BP HE: NORMAL
CV STRESS CURRENT HR HE: 75
CV STRESS CURRENT HR HE: 79
CV STRESS CURRENT HR HE: 79
CV STRESS CURRENT HR HE: 80
CV STRESS CURRENT HR HE: 81
CV STRESS CURRENT HR HE: 82
CV STRESS CURRENT HR HE: 84
CV STRESS CURRENT HR HE: 86
CV STRESS CURRENT HR HE: 88
CV STRESS CURRENT HR HE: 89
CV STRESS CURRENT HR HE: 90
CV STRESS CURRENT HR HE: 92
CV STRESS CURRENT HR HE: 93
CV STRESS DEVIATION TIME HE: NORMAL
CV STRESS ECHO PERCENT HR HE: NORMAL
CV STRESS EXERCISE STAGE HE: NORMAL
CV STRESS FINAL RESTING BP HE: NORMAL
CV STRESS FINAL RESTING HR HE: 88
CV STRESS MAX HR HE: 102
CV STRESS MAX TREADMILL GRADE HE: 0
CV STRESS MAX TREADMILL SPEED HE: 0
CV STRESS PEAK DIA BP HE: NORMAL
CV STRESS PEAK SYS BP HE: NORMAL
CV STRESS PHASE HE: NORMAL
CV STRESS PROTOCOL HE: NORMAL
CV STRESS RESTING PT POSITION HE: NORMAL
CV STRESS ST DEVIATION AMOUNT HE: NORMAL
CV STRESS ST DEVIATION ELEVATION HE: NORMAL
CV STRESS ST EVELATION AMOUNT HE: NORMAL
CV STRESS TEST TYPE HE: NORMAL
CV STRESS TOTAL STAGE TIME MIN 1 HE: NORMAL
RATE PRESSURE PRODUCT: NORMAL
STRESS ECHO BASELINE HR: 75
STRESS ECHO CALCULATED PERCENT HR: 67 %
STRESS ECHO LAST STRESS DIASTOLIC BP: 74
STRESS ECHO LAST STRESS HR: 88
STRESS ECHO LAST STRESS SYSTOLIC BP: 152
STRESS ECHO TARGET HR: 153

## 2024-10-07 PROCEDURE — 250N000011 HC RX IP 250 OP 636: Performed by: STUDENT IN AN ORGANIZED HEALTH CARE EDUCATION/TRAINING PROGRAM

## 2024-10-07 PROCEDURE — A9500 TC99M SESTAMIBI: HCPCS | Performed by: STUDENT IN AN ORGANIZED HEALTH CARE EDUCATION/TRAINING PROGRAM

## 2024-10-07 PROCEDURE — 93018 CV STRESS TEST I&R ONLY: CPT | Performed by: INTERNAL MEDICINE

## 2024-10-07 PROCEDURE — 343N000001 HC RX 343 MED OP 636: Performed by: STUDENT IN AN ORGANIZED HEALTH CARE EDUCATION/TRAINING PROGRAM

## 2024-10-07 PROCEDURE — 93017 CV STRESS TEST TRACING ONLY: CPT

## 2024-10-07 PROCEDURE — 93016 CV STRESS TEST SUPVJ ONLY: CPT | Performed by: INTERNAL MEDICINE

## 2024-10-07 PROCEDURE — 78452 HT MUSCLE IMAGE SPECT MULT: CPT | Mod: 26 | Performed by: INTERNAL MEDICINE

## 2024-10-07 PROCEDURE — 78452 HT MUSCLE IMAGE SPECT MULT: CPT

## 2024-10-07 RX ORDER — AMINOPHYLLINE 25 MG/ML
50-100 INJECTION, SOLUTION INTRAVENOUS
Status: DISCONTINUED | OUTPATIENT
Start: 2024-10-07 | End: 2024-10-08 | Stop reason: HOSPADM

## 2024-10-07 RX ORDER — REGADENOSON 0.08 MG/ML
0.4 INJECTION, SOLUTION INTRAVENOUS ONCE
Status: COMPLETED | OUTPATIENT
Start: 2024-10-07 | End: 2024-10-07

## 2024-10-07 RX ADMIN — Medication 9.9 MILLICURIE: at 10:01

## 2024-10-07 RX ADMIN — Medication 41.2 MILLICURIE: at 10:47

## 2024-10-07 RX ADMIN — REGADENOSON 0.4 MG: 0.08 INJECTION, SOLUTION INTRAVENOUS at 10:46

## 2024-11-12 NOTE — PROGRESS NOTES
Clinical Pharmacy Consult:                                                    Sharmila Shore is a 69 year old female seen for a clinical pharmacist consult.  She was referred to me from Lex Gauthier MD .     Reason for Consult: Patient assistance program    Discussion: Patient was on Trulicity for diabetes, but has been off for several months as she cannot afford the medication. Discussed switching to Ozempic, as Trulicity is not currently accepting new applicants for its assistance program. Patient agreed after discussing at length. Filled out application together. Will do a full MTM visit next month, as well as look into an assistance program for COPD medications.    Sample of Ozempic provided to patient in clinic    Plan:  Start Ozempic 0.25 mg once weekly for four weeks, then increase to 0.5 mg weekly     We applied for the assistance program for this medication. They will start sending it to [facility]     Bring all your medications to our next visit at [facility]      Follow-up: Return in 29 days (on 12/12/2024) for Follow up, in person @ 1:00 PM.      Erasmo Duckworth, Darrius, NURIS, BCACP  Medication Therapy Management Pharmacist

## 2024-11-13 ENCOUNTER — OFFICE VISIT (OUTPATIENT)
Dept: PHARMACY | Facility: PHYSICIAN GROUP | Age: 69
End: 2024-11-13
Payer: COMMERCIAL

## 2024-11-13 DIAGNOSIS — E11.9 TYPE 2 DIABETES MELLITUS WITHOUT COMPLICATION, WITHOUT LONG-TERM CURRENT USE OF INSULIN (H): Primary | ICD-10-CM

## 2024-11-13 PROCEDURE — 99207 PR NO CHARGE LOS: CPT | Performed by: PHARMACIST

## 2024-12-12 ENCOUNTER — VIRTUAL VISIT (OUTPATIENT)
Dept: PHARMACY | Facility: PHYSICIAN GROUP | Age: 69
End: 2024-12-12
Payer: COMMERCIAL

## 2024-12-12 DIAGNOSIS — I10 PRIMARY HYPERTENSION: ICD-10-CM

## 2024-12-12 DIAGNOSIS — E78.2 MIXED HYPERLIPIDEMIA: ICD-10-CM

## 2024-12-12 DIAGNOSIS — E03.9 ACQUIRED HYPOTHYROIDISM: ICD-10-CM

## 2024-12-12 DIAGNOSIS — F33.1 MODERATE RECURRENT MAJOR DEPRESSION (H): ICD-10-CM

## 2024-12-12 DIAGNOSIS — F41.9 ANXIETY: ICD-10-CM

## 2024-12-12 DIAGNOSIS — G89.4 CHRONIC PAIN SYNDROME: ICD-10-CM

## 2024-12-12 DIAGNOSIS — E11.9 TYPE 2 DIABETES MELLITUS WITHOUT COMPLICATION, WITHOUT LONG-TERM CURRENT USE OF INSULIN (H): Primary | ICD-10-CM

## 2024-12-12 DIAGNOSIS — J43.9 PULMONARY EMPHYSEMA, UNSPECIFIED EMPHYSEMA TYPE (H): ICD-10-CM

## 2024-12-12 RX ORDER — BUDESONIDE, GLYCOPYRROLATE, AND FORMOTEROL FUMARATE 160; 9; 4.8 UG/1; UG/1; UG/1
2 AEROSOL, METERED RESPIRATORY (INHALATION) 2 TIMES DAILY
COMMUNITY

## 2024-12-12 RX ORDER — TIZANIDINE 2 MG/1
2 TABLET ORAL 3 TIMES DAILY
COMMUNITY

## 2024-12-12 RX ORDER — ROSUVASTATIN CALCIUM 10 MG/1
10 TABLET, COATED ORAL DAILY
COMMUNITY

## 2024-12-12 RX ORDER — DULOXETIN HYDROCHLORIDE 60 MG/1
60 CAPSULE, DELAYED RELEASE ORAL DAILY
COMMUNITY

## 2024-12-12 NOTE — PROGRESS NOTES
Medication Therapy Management (MTM) Encounter    ASSESSMENT:                            Medication Adherence/Access: See below for considerations.    Diabetes  Stable. Once the shipment of Ozempic is received from [employer], she should increase to 0.5 mg weekly.    Hypertension   Stable    Hyperlipidemia   Stable     COPD   Patient could benefit from a maintenance inhaler. Affordability appears to be the primary barrier. She would likely qualify for AZ&Me, so could consider Breztri for maintenance. I can fill out an application and have the patient sign tomorrow at her appointment with Dr. Dye.    Mental Health   Anxiety and Depression  Stable     Hypothyroidism   Stable     Pain:   Stable    PLAN:                            Continue Ozempic at 0.25 mg once weekly until the shipment of the next dose arrives    We will fill out an application for Breztri, an inhaler for your COPD. You will inhale two puffs twice daily    Follow-up: Return in 5 weeks (on 1/17/2025) for Follow up, using a phone visit @ 1:00 PM.    Erasmo Duckworth, Darrius, NURIS, BCACP  Medication Therapy Management Pharmacist     SUBJECTIVE/OBJECTIVE:                          Sharmila Shore is a 69 year old female seen for an initial visit. She was referred to me from Tatiana Dye MD .      Reason for visit: Initial visit and diabetes consult.    Allergies/ADRs: Reviewed in chart  Past Medical History: Reviewed in chart  Tobacco: She reports that she quit smoking about 6 years ago. Her smoking use included cigarettes. She has never used smokeless tobacco.  Alcohol: not currently using    Medication Adherence/Access: Receives Ozempic through [employer]. Has issues affording brand name medications    Diabetes   Ozempic 0.25 mg weekly - started last month. Awaiting shipment from [employer] before increasing dose.  Aspirin 81 mg daily   Patient is not experiencing side effects.  Previous medications:   -Trulicity - stopped due to cost. Was tolerating  well  Current diabetes symptoms: none  Diet/Exercise: Limited mobility. Tries to eat healthy     Blood sugar monitoring: one time(s) daily; Ranges: (patient reported) Fasting- less than 100 in the mornings  Eye exam in the last 12 months? No  Foot exam: up to date    Hypertension   Losartan-hydrochlorothiazide 100-25 mg daily  Patient reports no current medication side effects  Patient does not self-monitor blood pressure.       Hyperlipidemia   Rosuvastatin 10 mg daily  Patient reports no significant myalgias or other side effects.     COPD   Symbicort - 160-4.5 - 2 puffs twice daily, but is not using consistently. She cannot afford new inhalers so is using Symbicort very sparingly.   Albuterol HFA every four hours as needed  Patient rinses their mouth after using steroid inhaler.    Patient reports no current medication side effects.    Patient reports the following symptoms: increased need of albuterol. States if she exerts herself, she really gets out of breath     Mental Health   Anxiety and Depression  Duloxetine 60 mg once daily.   Patient reports no current medication side effects.  Patient reports symptoms are stable. Uses mostly for neuropathy     Hypothyroidism   Levothyroxine 137 mcg daily.   Patient is having the following symptoms: none.      Pain:   Tizanidine 2 mg three times daily as needed  Norco  mg three times daily as needed  No issues reported    Gout:  Allopurinol 300 mg   No issues reported    Today's Vitals: There were no vitals taken for this visit. - virtual  ----------------      I spent 20 minutes with this patient today. All changes were made via collaborative practice agreement with Tatiana Dye MD.     A summary of these recommendations was declined by the patient.    Erasmo Duckworth PharmD, NURIS, BCACP  Medication Therapy Management Pharmacist       Telemedicine Visit Details  The patient's medications can be safely assessed via a telemedicine encounter.  Type of service:   Telephone visit  Originating Location (pt. Location): Home  Distant Location (provider location):  On-site  Start Time: 1:16 PM  End Time: 1:36 PM     Medication Therapy Recommendations  COPD (chronic obstructive pulmonary disease) (H)   1 Current Medication: budesonide-formoterol (SYMBICORT) 160-4.5 mcg/actuation inhaler (Discontinued)   Current Medication Sig: [BUDESONIDE-FORMOTEROL (SYMBICORT) 160-4.5 MCG/ACTUATION INHALER] Inhale 2 puffs every morning.    Rationale: Cannot afford medication product - Cost - Adherence   Recommendation: Change Medication - Breztri Aerosphere 160-9-4.8 MCG/ACT Aero   Status: Accepted per CPA   Identified Date: 12/12/2024 Completed Date: 12/12/2024

## 2024-12-12 NOTE — PATIENT INSTRUCTIONS
"Recommendations from today's MTM visit:                                                         Continue Ozempic at 0.25 mg once weekly until the shipment of the next dose arrives    We will fill out an application for Breztri, an inhaler for your COPD. You will inhale two puffs twice daily    Follow-up: Return in 5 weeks (on 1/17/2025) for Follow up, using a phone visit @ 1:00 PM.    It was great speaking with you today.  I value your experience and would be very thankful for your time in providing feedback in our clinic survey. In the next few days, you may receive an email or text message from [facility] with a link to a survey related to your  clinical pharmacist.\"     To schedule another MTM appointment, please call the clinic directly or you may call the MTM scheduling line at [phone].    My Clinical Pharmacist's contact information:                                                      Please feel free to contact me with any questions or concerns you have.      Erasmo Duckworth, Darrius, NURIS, BCACP  Medication Therapy Management Pharmacist     "

## 2025-02-04 ENCOUNTER — TRANSFERRED RECORDS (OUTPATIENT)
Dept: HEALTH INFORMATION MANAGEMENT | Facility: CLINIC | Age: 70
End: 2025-02-04
Payer: COMMERCIAL

## 2025-02-04 ENCOUNTER — LAB REQUISITION (OUTPATIENT)
Dept: LAB | Facility: CLINIC | Age: 70
End: 2025-02-04
Payer: COMMERCIAL

## 2025-02-04 DIAGNOSIS — E03.9 HYPOTHYROIDISM, UNSPECIFIED: ICD-10-CM

## 2025-02-04 DIAGNOSIS — E78.5 HYPERLIPIDEMIA, UNSPECIFIED: ICD-10-CM

## 2025-02-04 DIAGNOSIS — E11.51 TYPE 2 DIABETES MELLITUS WITH DIABETIC PERIPHERAL ANGIOPATHY WITHOUT GANGRENE (H): ICD-10-CM

## 2025-02-04 LAB — HBA1C MFR BLD: 6.3 % (ref 4.2–6.1)

## 2025-02-04 PROCEDURE — 82565 ASSAY OF CREATININE: CPT | Performed by: STUDENT IN AN ORGANIZED HEALTH CARE EDUCATION/TRAINING PROGRAM

## 2025-02-04 PROCEDURE — 82465 ASSAY BLD/SERUM CHOLESTEROL: CPT | Performed by: STUDENT IN AN ORGANIZED HEALTH CARE EDUCATION/TRAINING PROGRAM

## 2025-02-04 PROCEDURE — 80061 LIPID PANEL: CPT | Performed by: STUDENT IN AN ORGANIZED HEALTH CARE EDUCATION/TRAINING PROGRAM

## 2025-02-04 PROCEDURE — 80048 BASIC METABOLIC PNL TOTAL CA: CPT | Mod: ORL | Performed by: STUDENT IN AN ORGANIZED HEALTH CARE EDUCATION/TRAINING PROGRAM

## 2025-02-04 PROCEDURE — 82310 ASSAY OF CALCIUM: CPT | Performed by: STUDENT IN AN ORGANIZED HEALTH CARE EDUCATION/TRAINING PROGRAM

## 2025-02-04 PROCEDURE — 84443 ASSAY THYROID STIM HORMONE: CPT | Performed by: STUDENT IN AN ORGANIZED HEALTH CARE EDUCATION/TRAINING PROGRAM

## 2025-02-05 LAB
ANION GAP SERPL CALCULATED.3IONS-SCNC: 15 MMOL/L (ref 7–15)
BUN SERPL-MCNC: 12.5 MG/DL (ref 8–23)
CALCIUM SERPL-MCNC: 9.2 MG/DL (ref 8.8–10.4)
CHLORIDE SERPL-SCNC: 99 MMOL/L (ref 98–107)
CHOLEST SERPL-MCNC: 128 MG/DL
CREAT SERPL-MCNC: 0.68 MG/DL (ref 0.51–0.95)
EGFRCR SERPLBLD CKD-EPI 2021: >90 ML/MIN/1.73M2
FASTING STATUS PATIENT QL REPORTED: ABNORMAL
FASTING STATUS PATIENT QL REPORTED: ABNORMAL
GLUCOSE SERPL-MCNC: 159 MG/DL (ref 70–99)
HCO3 SERPL-SCNC: 26 MMOL/L (ref 22–29)
HDLC SERPL-MCNC: 32 MG/DL
LDLC SERPL CALC-MCNC: 47 MG/DL
NONHDLC SERPL-MCNC: 96 MG/DL
POTASSIUM SERPL-SCNC: 3.9 MMOL/L (ref 3.4–5.3)
SODIUM SERPL-SCNC: 140 MMOL/L (ref 135–145)
TRIGL SERPL-MCNC: 245 MG/DL
TSH SERPL DL<=0.005 MIU/L-ACNC: 0.31 UIU/ML (ref 0.3–4.2)

## 2025-07-01 NOTE — PROGRESS NOTES
"Medication Therapy Management (MTM) Encounter    ASSESSMENT:                            Medication Adherence/Access: See below for considerations.    Diabetes  Patient is meeting A1c goal of < 7%. Patient would benefit from continued Ozempic dosing. With current diarrhea that the patient is experiencing, reasonable to continue Ozempic at current dosing and reassess at next visit.   Emphasized continued dietary changes to assist with GI upset, specifically avoiding greasy/fatty foods. Patient could also consider increasing her fiber supplements, as she states her diet consists of very minimal fiber.     Discussed if side effects were too much for the patient and if she would like to decrease dosing, but she is adamant she wants to continue.     Supplements   Could benefit from increasing fiber as stated above.      PLAN:                              Continue Ozempic at 1 mg once weekly    Increase your fiber supplement servings from once daily to twice daily per the instructions on the label.    Some additional tips:    Helpful Nutritional/Eating Behavior Recommendations When On Injectable GLP-1 Agonist  Water intake: Hydrate, hydrate, hydrate!!   64-96 oz water daily (this does not include coffee/caffeine containing beverages)  Ensure adequate protein intake   This is to ensure your body has the building blocks necessary for adequate muscle maintenance and decrease likelihood of severity of muscle loss  Ensure getting in protein with each meal -   Protein can come from come from meat, dairy products, nuts, some vegetables, and certain grains and beans  Examples of high quality or \"complete\" proteins: chicken, turkey, lean ground beef, salmon, tuna, certain beans and lentils  Ensure adequate fiber intake   At least 21 grams per day for women   At least 30 grams per day for men   A food that has 3 or more gram fiber is considered a \"good fiber choice\"   Consider starting multivitamin containing calcium and vitamin D " if food intake more limited   Examples: Centrum, Nature Made, One-A-Day  To minimize side effects:   Smaller more frequent meals   Do not skip meals  Avoid foods that may worsen acid reflux, heartburn, bowel movements (fatty, rich, greasy, or acidic foods)   Stop eating when feeling satiated (~80% full)   Ensure having consistent bowel movements (every day or every 2 days at minimum)  If you are having straining, hard stools or having bowel movement every 3rd day or less, reach out to the care team      Follow-up: Return in 4 weeks (on 7/30/2025) for Follow up, using a phone visit @ 10:00 AM.    Erasmo Duckworth, PharmD, NURIS, BCACP  Medication Therapy Management Pharmacist     SUBJECTIVE/OBJECTIVE:                          Sharmila Shore is a 69 year old female seen for a follow up visit. She was referred to me from Tatiana Dye MD      Reason for visit: Diabetes follow up    Allergies/ADRs: Reviewed in chart  Past Medical History: Reviewed in chart  Tobacco: She reports that she quit smoking about 6 years ago. Her smoking use included cigarettes. She has never used smokeless tobacco.  Alcohol: not currently using    Medication Adherence/Access: Receives Ozempic through [facility] and Breztri through [facility]. Has issues affording brand name medications    Diabetes   Ozempic 1 mg weekly - tolerating well. Having some diarrhea still, states this about every three days or so. Notes that this occurs more often if she eats greasy foods. Notably, the fiber supplements she is taking only contain about 6 grams of fiber. She has one more pen of the Ozempic 1 mg and two months of the 2 mg remaining.    Aspirin 81 mg daily   Patient is not experiencing any other side effects.  Previous medications:   -Trulicity - stopped due to cost. Was tolerating well  Current diabetes symptoms: none  Diet/Exercise: Limited mobility. Tries to eat healthy     Blood sugar monitoring: one time(s) daily; Ranges: (patient reported) Fasting- less  than 100 in the mornings  Eye exam in the last 12 months? No  Foot exam: up to date  Last A1c: 6.3% (02/04/2025)  Patient reports she weighs 257.6 lbs now. Down from 270.6 lbs in February    Supplements   OTC psyllium fiber supplement equivalent to 6 grams of fiber daily in the morning  No reported issues at this time.         Today's Vitals: There were no vitals taken for this visit. - virtual  ----------------      I spent 15 minutes with this patient today. All changes were made via collaborative practice agreement with Tatiana Dye MD.     A summary of these recommendations was declined by the patient.    Erasmo Duckworth PharmD, NURIS, BCACP  Medication Therapy Management Pharmacist       Telemedicine Visit Details  The patient's medications can be safely assessed via a telemedicine encounter.  Type of service:  Telephone visit  Originating Location (pt. Location): Home  Distant Location (provider location):  On-site  Start Time: 2:04 PM  End Time: 2:19 PM     Medication Therapy Recommendations  Takes dietary supplements   1 Current Medication: Psyllium-Calcium (METAMUCIL PLUS CALCIUM) CAPS   Current Medication Sig: Take by mouth.   Rationale: Dose too low - Dosage too low - Effectiveness   Recommendation: Increase Dose   Status: Accepted per CPA   Identified Date: 7/2/2025 Completed Date: 7/2/2025

## 2025-07-02 ENCOUNTER — VIRTUAL VISIT (OUTPATIENT)
Dept: PHARMACY | Facility: PHYSICIAN GROUP | Age: 70
End: 2025-07-02
Payer: COMMERCIAL

## 2025-07-02 DIAGNOSIS — Z78.9 TAKES DIETARY SUPPLEMENTS: ICD-10-CM

## 2025-07-02 DIAGNOSIS — E11.9 TYPE 2 DIABETES MELLITUS WITHOUT COMPLICATION, WITHOUT LONG-TERM CURRENT USE OF INSULIN (H): Primary | ICD-10-CM

## 2025-07-02 PROCEDURE — 99207 PR NO CHARGE LOS: CPT | Mod: 93 | Performed by: PHARMACIST

## 2025-07-30 ENCOUNTER — VIRTUAL VISIT (OUTPATIENT)
Dept: PHARMACY | Facility: PHYSICIAN GROUP | Age: 70
End: 2025-07-30
Payer: COMMERCIAL

## 2025-07-30 DIAGNOSIS — E11.9 TYPE 2 DIABETES MELLITUS WITHOUT COMPLICATION, WITHOUT LONG-TERM CURRENT USE OF INSULIN (H): Primary | ICD-10-CM

## 2025-07-30 NOTE — PROGRESS NOTES
"Medication Therapy Management (MTM) Encounter    ASSESSMENT:                            Medication Adherence/Access: See below for considerations.    Diabetes  Patient is meeting A1c goal of < 7%. Patient would benefit from continued Ozempic titration. Again emphasized continued dietary changes to assist with GI upset, specifically avoiding greasy/fatty or other trigger foods.     Discussed if side effects were too much for the patient and if she would like to decrease dosing, but she is adamant she wants to continue.       PLAN:                              Increase Ozempic to 2 mg once weekly     Really focus on limiting greasy, fatty, or other \"trigger\" foods to help limit diarrhea when we increase this dose.    Follow-up: Return in 29 days (on 8/28/2025) for Follow up, using a phone visit @ 10:00 AM.    Erasmo Duckworth, PharmD, NURIS, BCACP  Medication Therapy Management Pharmacist     SUBJECTIVE/OBJECTIVE:                          Sharmila Shore is a 70 year old female seen for a follow up visit.       Reason for visit: Diabetes follow up    Allergies/ADRs: Reviewed in chart  Past Medical History: Reviewed in chart  Tobacco: She reports that she quit smoking about 6 years ago. Her smoking use included cigarettes. She has never used smokeless tobacco.  Alcohol: not currently using    Medication Adherence/Access: Receives Ozempic through [facility] and Breztri through [facility]. Has issues affording brand name medications    Diabetes   Ozempic 1 mg weekly - tolerating well. Having some diarrhea still, states this about every three days or so. Notes that this occurs more often if she eats greasy foods. States she hasn't been eating the best lately and that she has noticed more diarrhea when she eats bad.    Aspirin 81 mg daily   Patient is not experiencing any other side effects.  Previous medications:   -Trulicity - stopped due to cost. Was tolerating well  Current diabetes symptoms: none  Diet/Exercise: Limited " mobility. Tries to eat healthy     Blood sugar monitoring: one time(s) daily; Ranges: (patient reported) Fasting- less than 100 in the mornings  Eye exam in the last 12 months? No  Foot exam: up to date  Last A1c: 6.3% (02/04/2025)      Today's Vitals: There were no vitals taken for this visit. - virtual  ----------------      I spent 8 minutes with this patient today. All changes were made via collaborative practice agreement with Tatiana Dye MD.     A summary of these recommendations was declined by the patient.    Dwayne LeeD, NURIS, BCACP  Medication Therapy Management Pharmacist       Telemedicine Visit Details  The patient's medications can be safely assessed via a telemedicine encounter.  Type of service:  Telephone visit  Originating Location (pt. Location): Home  Distant Location (provider location):  On-site  Start Time: 10:00 AM  End Time: 10:08 AM     Medication Therapy Recommendations  Type 2 diabetes mellitus without complication, without long-term current use of insulin (H)   1 Current Medication: Semaglutide, 1 MG/DOSE, (OZEMPIC) 4 MG/3ML pen (Discontinued)   Current Medication Sig: Inject 1 mg subcutaneously every 7 days.   Rationale: Dose too low - Dosage too low - Effectiveness   Recommendation: Increase Dose - Ozempic (2 MG/DOSE) 8 MG/3ML Sopn   Status: Accepted per CPA   Identified Date: 7/30/2025 Completed Date: 7/30/2025